# Patient Record
Sex: MALE | Race: WHITE | NOT HISPANIC OR LATINO | Employment: FULL TIME | URBAN - METROPOLITAN AREA
[De-identification: names, ages, dates, MRNs, and addresses within clinical notes are randomized per-mention and may not be internally consistent; named-entity substitution may affect disease eponyms.]

---

## 2017-01-12 ENCOUNTER — HOSPITAL ENCOUNTER (OUTPATIENT)
Dept: UROLOGY | Facility: HOSPITAL | Age: 62
Discharge: HOME | End: 2017-01-12
Attending: UROLOGY
Payer: COMMERCIAL

## 2017-01-12 VITALS
TEMPERATURE: 99 F | HEART RATE: 76 BPM | DIASTOLIC BLOOD PRESSURE: 95 MMHG | HEIGHT: 69 IN | RESPIRATION RATE: 18 BRPM | BODY MASS INDEX: 28.14 KG/M2 | SYSTOLIC BLOOD PRESSURE: 149 MMHG | WEIGHT: 190 LBS

## 2017-01-12 DIAGNOSIS — Z80.42 FAMILY HISTORY OF MALIGNANT NEOPLASM OF PROSTATE: ICD-10-CM

## 2017-01-12 DIAGNOSIS — R31.0 GROSS HEMATURIA: ICD-10-CM

## 2017-01-12 DIAGNOSIS — N20.0 KIDNEY STONE ON LEFT SIDE: ICD-10-CM

## 2017-01-12 DIAGNOSIS — N35.9 URETHRAL STRICTURE, UNSPECIFIED STRICTURE TYPE: ICD-10-CM

## 2017-01-12 DIAGNOSIS — N40.0 BENIGN PROSTATIC HYPERPLASIA, PRESENCE OF LOWER URINARY TRACT SYMPTOMS UNSPECIFIED, UNSPECIFIED MORPHOLOGY: Primary | ICD-10-CM

## 2017-01-12 PROCEDURE — 52000 CYSTOURETHROSCOPY: CPT

## 2017-01-12 PROCEDURE — 25000003 PHARM REV CODE 250: Performed by: NURSE PRACTITIONER

## 2017-01-12 PROCEDURE — 52000 CYSTOURETHROSCOPY: CPT | Mod: ,,, | Performed by: UROLOGY

## 2017-01-12 RX ORDER — LIDOCAINE HYDROCHLORIDE 20 MG/ML
JELLY TOPICAL ONCE
Status: COMPLETED | OUTPATIENT
Start: 2017-01-12 | End: 2017-01-12

## 2017-01-12 RX ORDER — CIPROFLOXACIN 500 MG/1
500 TABLET ORAL ONCE
Status: COMPLETED | OUTPATIENT
Start: 2017-01-12 | End: 2017-01-12

## 2017-01-12 RX ADMIN — LIDOCAINE HYDROCHLORIDE: 20 JELLY TOPICAL at 09:01

## 2017-01-12 RX ADMIN — CIPROFLOXACIN HYDROCHLORIDE 500 MG: 750 TABLET, FILM COATED ORAL at 09:01

## 2017-01-12 NOTE — PATIENT INSTRUCTIONS
What to Expect After a Cystoscopy  For the next 24-48 hours, you may feel a mild burning when you urinate. This burning is normal and expected. Drink plenty of water to dilute the urine to help relieve the burning sensation. You may also see a small amount of blood in your urine after the procedure.    Unless you are already taking antibiotics, you may be given an antibiotic after the test to prevent infection.    Signs and Symptoms to Report  Call the [facility] at [phone] if you develop any of the following:  · Fever of 101 degrees or higher  · Chills or persistent bleeding  · Inability to urinate

## 2017-01-12 NOTE — IP AVS SNAPSHOT
[facility]  [address]  [facility] [address]  Phone: [phone]  Fax: [phone]                  Ruel Santacruz MD   2017  9:00 AM   Cystoscopy    Description:  Male : 1955   Provider:  RYAN UROLOGY   Department:  [facility]           Visit Information     Date & Time Provider Department    2017  9:00 AM RYAN UROLOGY [facility]      Recent Lab Values        2013 10/7/2013 10/11/2016 10/11/2016                  9:41 AM  1:17 PM  8:38 AM  3:30 PM        Urine Culture - - - No growth        Color - yellow Yellow -        Specific Gravity - 1.015 1.020 -        pH - 5 6.0 -        Leukocytes - n - -        Blood - n - -        Nitrite - n Negative -        Ketones - n Negative -        Bilirubin - n - -        Urobilinogen - n Negative -        Protein - n - -        Glucose - n - -        PSA 0.37 - - -        Comment for PSA at  9:41 AM on 2013:  PSA Expected levels:Hormonal Therapy: <0.05 ng/mlProstatectomy: <0.01 ng/mlRadiation Therapy: <1.00 ng/ml      Reason for Visit     Hematuria           Diagnoses this Visit        Comments    Urethral stricture, unspecified stricture type         Gross hematuria                To Do List           Goals (5 Years of Data)     None           Medications                ** Verify the list of medication(s) below is accurate and up to date. Carry this with you in case of emergency. If your medications have changed, please notify your healthcare provider.             Medication List      TAKE these medications        Additional Info                      oxycodone-acetaminophen 5-325 mg per tablet   Commonly known as:  PERCOCET   Quantity:  20 tablet   Refills:  0   Dose:  1 tablet    Instructions:  Take 1 tablet by mouth every 6 (six) hours as needed for Pain.     Begin Date    AM    Noon    PM    Bedtime       triamcinolone acetonide 0.1% 0.1 % cream   Commonly  "known as:  KENALOG   Quantity:  80 g   Refills:  3    Instructions:  Apply topically 2 (two) times daily. Apply to affected area 2-4 times daily     Begin Date    AM    Noon    PM    Bedtime               Your Vitals Were     Temp Resp Height Weight BMI    98.6 °F (37 °C) (Oral) 18 5' 9" (1.753 m) 86.2 kg (190 lb) 28.06 kg/m2      Allergies as of 1/12/2017     Iodine And Iodide Containing Products      Immunizations Administered on Date of Encounter - 1/12/2017     None      Orders Placed During Today's Visit      Normal Orders This Visit    Cystoscopy       Instructions    What to Expect After a Cystoscopy  For the next 24-48 hours, you may feel a mild burning when you urinate. This burning is normal and expected. Drink plenty of water to dilute the urine to help relieve the burning sensation. You may also see a small amount of blood in your urine after the procedure.    Unless you are already taking antibiotics, you may be given an antibiotic after the test to prevent infection.    Signs and Symptoms to Report  Call the [facility] at [phone] if you develop any of the following:  · Fever of 101 degrees or higher  · Chills or persistent bleeding  · Inability to urinate       Advance Directives     An advance directive is a document which, in the event you are no longer able to make decisions for yourself, tells your healthcare team what kind of treatment you do or do not want to receive, or who you would like to make those decisions for you.  If you do not currently have an advance directive, [facility] encourages you to create one.  For more information call:  [phone]), [phone] ([phone]),  or log on to [email or web address].        [facility] On Call     [facility] Care Line - 24/7 Assistance  Registered nurses in the [facility] provide clinical advisement, health education, appointment booking, and other advisory services.  Call for this free " service at [phone].        Language Assistance Services     ATTENTION: Language assistance services are available, free of charge. Please call [phone].      ATENCIÓN: Si habla jay jay, tiene a perez disposición servicios gratuitos de asistencia lingüística. Llame al [phone].     CHÚ Ý: N?u b?n nói Ti?ng Vi?t, có các d?ch v? h? tr? ngôn ng? mi?n phí dành cho b?n. G?i s? [phone].         [facility] complies with applicable Federal civil rights laws and does not discriminate on the basis of race, color, national origin, age, disability, or sex.

## 2017-01-12 NOTE — IP AVS SNAPSHOT
[address]  [place] [address]  Phone: [phone]           I have received a copy of my After Visit Summary and discharge instructions from [facility].    INSTRUCTIONS RECEIVED AND UNDERSTOOD BY:                     Patient/Patient Representative: ________________________________________________________________     Date/Time: ________________________________________________________________                     Instructions Given By: ________________________________________________________________     Date/Time: ________________________________________________________________

## 2017-01-12 NOTE — INTERVAL H&P NOTE
The patient has been examined and the H&P has been reviewed:  Is here for cysto to follow up his urethral stricture.    I concur with the findings and no changes have occurred since H&P was written.        There are no hospital problems to display for this patient.

## 2017-01-12 NOTE — H&P
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  His father and older brother were diagnosed with prostate cancer at age 60's  He is a physician at [employer].  He was last seen in clinic 10/11/2016 for gross hematuria.     11/03/2016 cysto  Findings:  Urethra: membranous urethral stricture, pinhole in size.  Guidewire placed under direct vision thru the stricture area and the stricture was dilated up to 18F in size by using Haymen dilators.   It was complicated by false passage during dilation. Glide wire, super stiff guide wire used to establish the normal urethral channel.  Then using Haymen dilators the stricture area was dilated from 10 F to 18 F in size.  18 F Gould tip catheter placed over the guidewire after cysto was done.      Sphincter: competent.  Prostate: inflammed 4 cm bilateral mild obstruction  Bladder neck: patent with no stricture  Bladder: Normal bladder.   Normal ureteral orifices bilaterally.   Moderate trabeculation.      Here today for torres removal.  Torres draining well.  No hematuria.              PSA 0.33 01/05/2016   PSA 0.48 01/07/2015   PSA 0.37 09/28/2013   PSA 0.52 08/21/2012   PSA 0.49 10/18/2011   PSA 0.58 08/12/2011   PSA 0.64 07/12/2010                Past Medical History:  Neutropenia   Sensorineural hearing loss, unspecified   Tear of lateral cartilage or meniscus of knee,*   Thrombocytopenia      Past Surgical History:  APPENDECTOMY   KNEE SURGERY 4/2012   Comment:left knee- arthroscopic-- torn meniscus  BCC- nose      Review of patient's family history indicates:  Cancer Mother   Comment: breast  Cancer Father   Comment: prostate  Cancer Brother   Comment: prostate        Social History  Marital Status:  Spouse Name:   Years of Education: Number of children:      Occupational History  None on file     Social History Main Topics  Smoking Status: Never  Smoker   Smokeless Status: Not on file   Alcohol Use: No   Drug Use: Not on file   Sexual Activity: Not on file      Other Topics Concern  None on file     Social History Narrative     Spouse in good health   3 children , 1 son, 2 dtrs           Allergies:  Review of patient's allergies indicates no known allergies.     Medications:  Current outpatient prescriptions:  valacyclovir (VALTREX) 1000 MG tablet, , Disp: , Rfl:                     Review of Systems   Constitutional: Negative. Negative for activity change, appetite change and fever.   HENT: Negative. Negative for facial swelling and trouble swallowing.   Eyes: Negative.   Respiratory: Negative. Negative for shortness of breath.   Cardiovascular: Negative. Negative for chest pain and palpitations.   Gastrointestinal: Negative for abdominal pain, constipation, diarrhea, nausea and vomiting.   Genitourinary: Positive for penile pain. Negative for difficulty urinating, dysuria, enuresis, flank pain, frequency, genital sores, hematuria, nocturia, scrotal swelling, testicular pain and urgency.   Bello draining;   Distal penis discomfort.  Musculoskeletal: Negative for back pain, gait problem and neck stiffness.   Skin: Negative. Negative for wound.   Neurological: Negative for dizziness, tremors, seizures, syncope, speech difficulty, light-headedness and headaches.   Hematological: Does not bruise/bleed easily.   Psychiatric/Behavioral: Negative for confusion and hallucinations. The patient is not nervous/anxious.       Objective:      Physical Exam   Nursing note and vitals reviewed.  Constitutional: He is oriented to person, place, and time. Vital signs are normal. He appears well-developed and well-nourished. He is active and cooperative. Non-toxic appearance. He does not have a sickly appearance.   HENT:   Head: Normocephalic and atraumatic.   Right Ear: External ear normal.   Left Ear: External ear normal.   Nose: Nose normal.   Mouth/Throat: Mucous  membranes are normal.   Eyes: Conjunctivae and lids are normal. No scleral icterus.   Neck: Trachea normal, normal range of motion and full passive range of motion without pain. Neck supple. No JVD present. No tracheal deviation present.   Cardiovascular: Normal rate, regular rhythm, S1 normal and S2 normal.   Pulmonary/Chest: Effort normal and breath sounds normal. No respiratory distress. He exhibits no tenderness.   Abdominal: Soft. Normal appearance and bowel sounds are normal. There is no hepatosplenomegaly. There is no tenderness. There is no rebound, no guarding and no CVA tenderness.   Genitourinary: Testes normal. Right testis shows no mass, no swelling and no tenderness. Left testis shows no mass, no swelling and no tenderness. Circumcised. Penile erythema and penile tenderness present. No hypospadias. No discharge found.   Musculoskeletal: Normal range of motion.   Neurological: He is alert and oriented to person, place, and time. He has normal strength.   Skin: Skin is warm, dry and intact.     Psychiatric: He has a normal mood and affect. His behavior is normal. Judgment and thought content normal.       Assessment:       1. Urethral stricture, unspecified stricture type    2. Gross hematuria        Plan:         I spent 15 minutes with the patient of which more than half was spent in direct consultation with the patient in regards to our treatment and plan.   Education and recommendations of today's plan of care including home remedies.  Torres easily removed; discussed post torres removal expectations.  RTC 2 months for cysto with Dr. Ojeda.

## 2017-01-12 NOTE — PROCEDURES
Procedure Date:  01/12/2017      Procedure:  Male Diagnostic Cystourethroscopy    Pre-op diagnosis: proximal bulbar urethral stricture  Post-op diagnosis: same, left lower pole kidney stone  Anesthesia: Local  Surgeon:  Jonathan Ojeda MD    Findings:  Urethra:  Normal urethra with 17 F size stable bulbar stricture 2 cm away from the external sphincter.   Sphincter: competent.  Prostate: Estimated Length Prostatic Urethra: 3.5 cm with mild obstruction  Bladder neck: patent with no stricture  Bladder:  Normal bladder.   Normal ureteral orifices bilaterally.   Mild trabeculation.     Description of Procedure:                                                         Informed Consent:                                                            - Risks, benefits and alternatives of procedure discussed with               patient and informed consent obtained.       Patient Position:   - Supine. --- Bladder ---   Prep and Drape:   - Patient prepped and draped in usual sterile fashion using povidone     iodine (Betadine).   Instruments:   - 16 Fr flexible cystoscope with 0 degree lens.   Procedure Details:   - Cystoscope passed under vision into bladder.   - Bladder and urethra examined in their entirety with findings as     above.     Conclusion:  1. Stable bulbar urethral stricture  2. Left lower pole stone 8 mm in size.  If he desires, we can plan left ESWL.  Otherwise, recommend annual check up with KUB to follow his kidney stone burden.  If he notices decreased urine flow, may consider DVIU combined with left ESWL.    Plan:  Patient was discharged home in a stable condition.  Medications: cipro  Follow up:  1 year with PSA and KUB    Benign prostatic hyperplasia, presence of lower urinary tract symptoms unspecified, unspecified morphology  -     Prostate Specific Antigen, Diagnostic; Future; Expected date: 1/12/17    Urethral stricture, unspecified stricture type  -     Cystoscopy; Standing  -     Cystoscopy    Gross  hematuria  -     Cystoscopy; Standing  -     Cystoscopy    Kidney stone on left side  -     X-Ray Abdomen AP 1 View; Future; Expected date: 1/12/17    Family history of malignant neoplasm of prostate  -     Prostate Specific Antigen, Diagnostic; Future; Expected date: 1/12/17    Other orders  -     lidocaine HCl 2% urojet; Place into the urethra once.  -     ciprofloxacin HCl tablet 500 mg; Take 1 tablet (500 mg total) by mouth once.

## 2017-05-04 ENCOUNTER — TELEPHONE (OUTPATIENT)
Dept: UROLOGY | Facility: CLINIC | Age: 62
End: 2017-05-04

## 2017-05-04 DIAGNOSIS — N52.9 ED (ERECTILE DYSFUNCTION) OF ORGANIC ORIGIN: Primary | ICD-10-CM

## 2017-05-04 RX ORDER — TADALAFIL 20 MG/1
20 TABLET ORAL
Qty: 8 TABLET | Refills: 11 | Status: SHIPPED | OUTPATIENT
Start: 2017-05-04 | End: 2022-02-23

## 2017-05-04 NOTE — TELEPHONE ENCOUNTER
Reports issue with ED; would like to try oral agent  Discussed benefits, risks, se of cialis.  Rx sent to [facility].  Will let me know if any problems

## 2017-05-04 NOTE — TELEPHONE ENCOUNTER
----- Message from Harriett Eisenberg LPN sent at 5/4/2017  8:13 AM CDT -----  Contact: [phone]  Dr.Delord Kerry would like for you to call him back. He has some questions for you.   Call him on his cell [phone]      Thank you,  Harriett

## 2017-06-01 ENCOUNTER — TELEPHONE (OUTPATIENT)
Dept: INTERNAL MEDICINE | Facility: CLINIC | Age: 62
End: 2017-06-01

## 2017-06-01 DIAGNOSIS — Z11.59 NEED FOR HEPATITIS C SCREENING TEST: ICD-10-CM

## 2017-06-01 DIAGNOSIS — Z00.00 ANNUAL PHYSICAL EXAM: Primary | ICD-10-CM

## 2017-06-02 ENCOUNTER — TELEPHONE (OUTPATIENT)
Dept: INTERNAL MEDICINE | Facility: CLINIC | Age: 62
End: 2017-06-02

## 2017-06-02 NOTE — TELEPHONE ENCOUNTER
----- Message from Jo-Ann Rdz MD sent at 6/2/2017 12:52 PM CDT -----  Please review your lab work and we will discuss at your pending office visit.  Jo-Ann Barton

## 2017-06-05 NOTE — PROGRESS NOTES
61-year-old gentleman presents today for Annual PE.     nonsmoker and non-alcohol consumer.                                                                                                                       FAMILY HISTORY:    Dad d. prostate cancer, HTN.    Mom d. breast cancer. +atrial fibrillation, HTN, & mild  dementia.    Brother, + prostate cancer & hypertension.           Grandmother s/p diabetes and both parents had hypertension.                                                                                               SCREENING TEST:   Chol/lab, reviewed  C-scope, 3/2008, Dr. Williamson- rec 5-7  PSA/prostate, Dr. Ojeda, c-scope 2/2 hematuria 10/2016, s/p stenosis w/ dilatation  Eye exam, UTD  DDS exam,UTD  VACCINATIONS:   TDAP, UTD, >8yrs <10yrs   Flu, yearly   Zostavax, s/p shingles ` 2 yrs   Pneumovax, n/a   Prevnar, n/a                                                                                             REVIEW OF SYSTEMS:  No weight change.   No fever, chills, or night sweats  No sinus or ear congestion or pain.  No cough or wheezing   Occasional indigestion when he        drinks too much coffee and he just cuts down on his consumption  No headache, dizziness,     No chest pain, shortness of breath, PND or orthopnea.    No change in bowel or bladder function.    No blood in the stool or urine.    No joint problems or skin lesions  Remainder of review negative except as previously noted.                                                                                                                                                                                                                                         SOCIAL HISTORY:     30 + years, wife in good health.    Three children,     GILDA Albarran .w/ Yissel stockton and Romero Jesus , and FCO Wallace pending, Edin works  @ [employer]       Elidia, PT  Assistant, headed for PT school, in [place]  Drinks no more than 4 cups of  coffee a day.  Wears seatbelt.           Exercises on a regular basis.  Run most every morning around 4:30 to 5 o'    clock, vacations when he can.  Internist, working @ [employer]                                                                                                                             PHYSICAL EXAM:   VSS                        GENERAL:  He is alert and oriented and in no acute distress.  He is well     developed, well nourished, conversant and cooperative.  Pleasant as always                     EYES:Conjunctivae and lids are okay. Sclera anicteric   ENT: Oropharynx is unremarkable.                 NECK:  Supple.  No thyromegaly noted.                                        RESPIRATORY:  Efforts unlabored.  Lungs are clear to auscultation.           HEART:  Regular rate and rhythm.  No carotid bruits noted,   1+ pedal pulses,no edema.                                                                    ABDOMEN:  Bowel sounds are present, soft and nontender.  No                  hepatosplenomegaly.                                                          MSK: Gait is normal. No CCE  NEURO: EARLY. No tremore noted    SKIN: Warm and dry                                                                                                                                        IMPRESSION:                                                                   Annual PE                                                                 Family history of prostate cancer.                                         Family history of cardiovascular disease.                                                                                                                                                               PLAN:    Reviewed lab  Continue diet and exercise  Call prn  RTC 1 year EPP

## 2017-06-06 ENCOUNTER — OFFICE VISIT (OUTPATIENT)
Dept: INTERNAL MEDICINE | Facility: CLINIC | Age: 62
End: 2017-06-06
Payer: COMMERCIAL

## 2017-06-06 VITALS
BODY MASS INDEX: 27.75 KG/M2 | WEIGHT: 187.38 LBS | TEMPERATURE: 98 F | DIASTOLIC BLOOD PRESSURE: 69 MMHG | HEIGHT: 69 IN | SYSTOLIC BLOOD PRESSURE: 131 MMHG | RESPIRATION RATE: 16 BRPM | HEART RATE: 63 BPM

## 2017-06-06 DIAGNOSIS — Z00.00 ANNUAL PHYSICAL EXAM: Primary | ICD-10-CM

## 2017-06-06 PROBLEM — N35.919 URETHRAL STRICTURE: Status: ACTIVE | Noted: 2017-06-06

## 2017-06-06 PROBLEM — K63.5 HYPERPLASTIC COLONIC POLYP: Status: ACTIVE | Noted: 2017-06-06

## 2017-06-06 PROBLEM — N20.0 NEPHROLITHIASIS: Status: ACTIVE | Noted: 2017-06-06

## 2017-06-06 PROCEDURE — 99999 PR PBB SHADOW E&M-EST. PATIENT-LVL III: CPT | Mod: PBBFAC,,, | Performed by: INTERNAL MEDICINE

## 2017-06-06 PROCEDURE — 99386 PREV VISIT NEW AGE 40-64: CPT | Mod: S$GLB,,, | Performed by: INTERNAL MEDICINE

## 2018-01-04 ENCOUNTER — TELEPHONE (OUTPATIENT)
Dept: UROLOGY | Facility: CLINIC | Age: 63
End: 2018-01-04

## 2018-01-05 ENCOUNTER — TELEPHONE (OUTPATIENT)
Dept: SURGERY | Facility: CLINIC | Age: 63
End: 2018-01-05

## 2018-02-05 ENCOUNTER — OFFICE VISIT (OUTPATIENT)
Dept: UROLOGY | Facility: CLINIC | Age: 63
End: 2018-02-05
Payer: COMMERCIAL

## 2018-02-05 VITALS
HEART RATE: 62 BPM | WEIGHT: 185 LBS | DIASTOLIC BLOOD PRESSURE: 89 MMHG | SYSTOLIC BLOOD PRESSURE: 150 MMHG | BODY MASS INDEX: 27.4 KG/M2 | HEIGHT: 69 IN

## 2018-02-05 DIAGNOSIS — R31.29 HEMATURIA, MICROSCOPIC: ICD-10-CM

## 2018-02-05 DIAGNOSIS — N35.9 URETHRAL STRICTURE, UNSPECIFIED STRICTURE TYPE: Primary | ICD-10-CM

## 2018-02-05 DIAGNOSIS — N52.9 ED (ERECTILE DYSFUNCTION) OF ORGANIC ORIGIN: ICD-10-CM

## 2018-02-05 DIAGNOSIS — N20.0 NEPHROLITHIASIS: ICD-10-CM

## 2018-02-05 PROCEDURE — 3008F BODY MASS INDEX DOCD: CPT | Mod: S$GLB,,, | Performed by: UROLOGY

## 2018-02-05 PROCEDURE — 99999 PR PBB SHADOW E&M-EST. PATIENT-LVL III: CPT | Mod: PBBFAC,,, | Performed by: UROLOGY

## 2018-02-05 PROCEDURE — 99214 OFFICE O/P EST MOD 30 MIN: CPT | Mod: S$GLB,,, | Performed by: UROLOGY

## 2018-02-05 RX ORDER — SILDENAFIL 100 MG/1
100 TABLET, FILM COATED ORAL DAILY PRN
Qty: 5 TABLET | Refills: 12 | Status: SHIPPED | OUTPATIENT
Start: 2018-02-05 | End: 2018-03-07

## 2018-02-05 RX ORDER — CIPROFLOXACIN 250 MG/1
500 TABLET, FILM COATED ORAL ONCE
Status: CANCELLED | OUTPATIENT
Start: 2018-02-05 | End: 2018-02-05

## 2018-02-05 RX ORDER — LIDOCAINE HYDROCHLORIDE 20 MG/ML
JELLY TOPICAL ONCE
Status: CANCELLED | OUTPATIENT
Start: 2018-02-05 | End: 2018-02-05

## 2018-02-05 NOTE — PROGRESS NOTES
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  His father and older brother were diagnosed with prostate cancer at age 60's  He is a physician at [employer].  He was last seen in clinic 10/11/2016 for gross hematuria.    11/03/2016 cysto  Findings:  Urethra: membranous urethral stricture, pinhole in size.  Guidewire placed under direct vision thru the stricture area and the stricture was dilated up to 18F in size by using Haymen dilators.   It was complicated by false passage during dilation. Glide wire, super stiff guide wire used to establish the normal urethral channel.  Then using Haymen dilators the stricture area was dilated from 10 F to 18 F in size.  18 F [place] tip catheter placed over the guidewire after cysto was done.     Sphincter: competent.  Prostate: inflammed 4 cm bilateral mild obstruction  Bladder neck: patent with no stricture  Bladder: Normal bladder.   Normal ureteral orifices bilaterally.   Moderate trabeculation.     Since his cysto, he has been voiding well.  However, he notices blood in urine after running.    Had KUB recently.             Lab Results       Component                Value               Date                       PSA                      0.37                09/28/2013                 PSA                      0.52                08/21/2012                 PSA                      0.49                10/18/2011                 PSADIAG                  0.48                06/02/2017                 PSADIAG                  0.33                01/05/2016                 PSADIAG                  0.48                01/07/2015                  Past Medical History:    Neutropenia                                                    Sensorineural hearing loss, unspecified                       Tear of lateral cartilage or meniscus of knee,*               Thrombocytopenia                                              Past Surgical History:    APPENDECTOMY                                                   KNEE SURGERY                                     4/2012          Comment:left knee- arthroscopic-- torn meniscus    BCC- nose                                                      Review of patient's family history indicates:    Cancer                         Mother                      Comment: breast    Cancer                         Father                      Comment: prostate    Cancer                         Brother                     Comment: prostate      Social History    Marital Status:              Spouse Name:                       Years of Education:                 Number of children:               Occupational History    None on file    Social History Main Topics    Smoking Status: Never Smoker                      Smokeless Status: Not on file                       Alcohol Use: No              Drug Use: Not on file     Sexual Activity: Not on file          Other Topics            Concern    None on file    Social History Narrative         Spouse in good health     3 children , 1 son, 2 dtrs        Allergies:  Review of patient's allergies indicates no known allergies.    Medications:  Current outpatient prescriptions:   valacyclovir (VALTREX) 1000 MG tablet, , Disp: , Rfl:                   Medication Refill   Pertinent negatives include no abdominal pain, chest pain, fever, headaches, nausea or vomiting.     Review of Systems   Constitutional: Negative.  Negative for activity change, appetite change and fever.   HENT: Negative.  Negative for facial swelling and trouble swallowing.    Eyes: Negative.    Respiratory: Negative.  Negative for shortness of breath.    Cardiovascular: Negative.  Negative for chest pain  and palpitations.   Gastrointestinal: Negative for abdominal pain, constipation, diarrhea, nausea and vomiting.   Genitourinary: Negative for difficulty urinating, dysuria, enuresis, flank pain, frequency, genital sores, hematuria, nocturia, penile pain, scrotal swelling, testicular pain and urgency.        Bello draining;   Distal penis discomfort.     Musculoskeletal: Negative for back pain, gait problem and neck stiffness.   Skin: Negative.  Negative for wound.   Neurological: Negative for dizziness, tremors, seizures, syncope, speech difficulty, light-headedness and headaches.   Hematological: Does not bruise/bleed easily.   Psychiatric/Behavioral: Negative for confusion and hallucinations. The patient is not nervous/anxious.        Objective:      Physical Exam   Nursing note and vitals reviewed.  Constitutional: He is oriented to person, place, and time. Vital signs are normal. He appears well-developed and well-nourished. He is active and cooperative.  Non-toxic appearance. He does not have a sickly appearance.   HENT:   Head: Normocephalic and atraumatic.   Right Ear: External ear normal.   Left Ear: External ear normal.   Nose: Nose normal.   Mouth/Throat: Mucous membranes are normal.   Eyes: Conjunctivae and lids are normal. No scleral icterus.   Neck: Trachea normal, normal range of motion and full passive range of motion without pain. Neck supple. No JVD present. No tracheal deviation present.   Cardiovascular: Normal rate, regular rhythm, S1 normal and S2 normal.    Pulmonary/Chest: Effort normal and breath sounds normal. No respiratory distress. He exhibits no tenderness.   Abdominal: Soft. Normal appearance and bowel sounds are normal. There is no hepatosplenomegaly. There is no tenderness. There is no rebound, no guarding and no CVA tenderness.   Genitourinary: Testes normal. Right testis shows no mass, no swelling and no tenderness. Left testis shows no mass, no swelling and no tenderness.  Circumcised. No hypospadias, penile erythema or penile tenderness. No discharge found.   Musculoskeletal: Normal range of motion.   Neurological: He is alert and oriented to person, place, and time. He has normal strength.   Skin: Skin is warm, dry and intact.     Psychiatric: He has a normal mood and affect. His behavior is normal. Judgment and thought content normal.       Assessment:       Ruel was seen today for annual exam and medication refill.    Diagnoses and all orders for this visit:    Urethral stricture, unspecified stricture type  -     Cystoscopy; Future  -     Cystoscopy; Future    ED (erectile dysfunction) of organic origin  -     sildenafil (VIAGRA) 100 MG tablet; Take 1 tablet (100 mg total) by mouth daily as needed for Erectile Dysfunction.    Nephrolithiasis  -     US Retroperitoneal Limited; Future    Hematuria, microscopic  -     US Retroperitoneal Limited; Future  -     Cystoscopy; Future  -     Cystoscopy; Future    Other orders  -     lidocaine HCl 2% urojet; Place into the urethra once.  -     ciprofloxacin HCl tablet 500 mg; Take 2 tablets (500 mg total) by mouth once.      Plan:         will try viagra.  If not improving his ED, recommend to consider PEP injection.  For his hematuria, will check US of bladder and kidneys.  Repeat cysto.  I spent 25 minutes with the patient of which more than half was spent in direct consultation with the patient in regards to our treatment and plan.        Follow-up for cysto.     CT 10/12/2016     1. Multiple bilateral nonobstructing renal calculi.    2. Dilatation of inferior vena cava and pelvic veins with normal cardiac size, this is of unknown clinical significance

## 2018-02-08 ENCOUNTER — TELEPHONE (OUTPATIENT)
Dept: UROLOGY | Facility: CLINIC | Age: 63
End: 2018-02-08

## 2018-02-08 NOTE — TELEPHONE ENCOUNTER
Patient requesting to delay cysto to the first week of April .priscila,t made and confirmed for 4.5.2018 at 745am

## 2018-02-08 NOTE — TELEPHONE ENCOUNTER
----- Message from Monisha Cheung sent at 2/8/2018  8:26 AM CST -----  Contact: Self- [phone]  Ojeda- pt called to speak with Sylvia regarding rescheduling his upcoming cystoscope- please call pt back at [phone]

## 2018-04-05 ENCOUNTER — HOSPITAL ENCOUNTER (OUTPATIENT)
Dept: UROLOGY | Facility: HOSPITAL | Age: 63
Discharge: HOME | End: 2018-04-05
Attending: UROLOGY
Payer: COMMERCIAL

## 2018-04-05 VITALS
DIASTOLIC BLOOD PRESSURE: 85 MMHG | WEIGHT: 190.69 LBS | TEMPERATURE: 98 F | HEART RATE: 71 BPM | RESPIRATION RATE: 18 BRPM | BODY MASS INDEX: 28.24 KG/M2 | HEIGHT: 69 IN | SYSTOLIC BLOOD PRESSURE: 174 MMHG

## 2018-04-05 DIAGNOSIS — R31.29 HEMATURIA, MICROSCOPIC: ICD-10-CM

## 2018-04-05 DIAGNOSIS — N35.9 URETHRAL STRICTURE, UNSPECIFIED STRICTURE TYPE: ICD-10-CM

## 2018-04-05 PROCEDURE — 52000 CYSTOURETHROSCOPY: CPT | Mod: ,,, | Performed by: UROLOGY

## 2018-04-05 PROCEDURE — 52000 CYSTOURETHROSCOPY: CPT

## 2018-04-05 RX ORDER — CIPROFLOXACIN 500 MG/1
500 TABLET ORAL ONCE
Status: COMPLETED | OUTPATIENT
Start: 2018-04-05 | End: 2018-04-05

## 2018-04-05 RX ORDER — SILDENAFIL 100 MG/1
100 TABLET, FILM COATED ORAL DAILY PRN
COMMUNITY
End: 2022-02-23

## 2018-04-05 RX ORDER — LIDOCAINE HYDROCHLORIDE 20 MG/ML
JELLY TOPICAL ONCE
Status: COMPLETED | OUTPATIENT
Start: 2018-04-05 | End: 2018-04-05

## 2018-04-05 RX ADMIN — LIDOCAINE HYDROCHLORIDE: 20 JELLY TOPICAL at 08:04

## 2018-04-05 RX ADMIN — CIPROFLOXACIN 500 MG: 500 TABLET ORAL at 08:04

## 2018-04-05 NOTE — INTERVAL H&P NOTE
The patient has been examined and the H&P has been reviewed:  Lab Results   Component Value Date    PSA 0.37 09/28/2013    PSA 0.52 08/21/2012    PSA 0.49 10/18/2011    PSADIAG 0.48 06/02/2017    PSADIAG 0.33 01/05/2016    PSADIAG 0.48 01/07/2015     I concur with the findings and no changes have occurred since H&P was written.        There are no hospital problems to display for this patient.

## 2018-04-05 NOTE — PROCEDURES
Procedure Date:  04/05/2018      Procedure:  Male Diagnostic Cystourethroscopy    Pre-op diagnosis: hematuria, hx of urethral stricture, left renal stone  Post-op diagnosis: normal cysto  Anesthesia: Local  Surgeon:  Jonathan Ojeda MD    Findings:  Urethra:  Normal urethra. Mild stricture involving very proximal bulbar urethra near the external sphincter.  Sphincter: competent.  Prostate: Estimated Length Prostatic Urethra: 4 cm with mild bilateral obstruction with some mucosal irritation, multiple micro-stones on the mucosa  Bladder neck: patent with no stricture  Bladder:  Normal bladder.   Normal ureteral orifices bilaterally.   Mild to Moderate trabeculation.     Description of Procedure:                                                         Informed Consent:                                                            - Risks, benefits and alternatives of procedure discussed with               patient and informed consent obtained.       Patient Position:   - Supine. --- Bladder ---   Prep and Drape:   - Patient prepped and draped in usual sterile fashion using povidone     iodine (Betadine).   Instruments:   - 16 Fr flexible cystoscope with 0 degree lens.   Procedure Details:   - Cystoscope passed under vision into bladder.   - Bladder and urethra examined in their entirety with findings as     above.     Conclusion:  1. Normal cysto  Hematuria may be related to prostate irritation.  In the future, he has recurrent hematuria typically following his running, we may do urine for cytology.  2. Left renal stone.  Recommend left ESWL sine it is growing is in size.  He would consider it this October after his vacation trip.    Plan:  Patient was discharged home in a stable condition.  Medications: cipro  Follow up:  As needed.

## 2018-04-05 NOTE — H&P
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  His father and older brother were diagnosed with prostate cancer at age 60's  He is a physician at [employer].  He was last seen in clinic 10/11/2016 for gross hematuria.     11/03/2016 cysto  Findings:  Urethra: membranous urethral stricture, pinhole in size.  Guidewire placed under direct vision thru the stricture area and the stricture was dilated up to 18F in size by using Haymen dilators.   It was complicated by false passage during dilation. Glide wire, super stiff guide wire used to establish the normal urethral channel.  Then using Haymen dilators the stricture area was dilated from 10 F to 18 F in size.  18 F [place] tip catheter placed over the guidewire after cysto was done.     Sphincter: competent.  Prostate: inflammed 4 cm bilateral mild obstruction  Bladder neck: patent with no stricture  Bladder: Normal bladder.   Normal ureteral orifices bilaterally.   Moderate trabeculation.      Since his cysto, he has been voiding well.  However, he notices blood in urine after running.     Had KUB recently.             Lab Results       Component                Value               Date                       PSA                      0.37                09/28/2013                 PSA                      0.52                08/21/2012                 PSA                      0.49                10/18/2011                 PSADIAG                  0.48                06/02/2017                 PSADIAG                  0.33                01/05/2016                 PSADIAG                  0.48                01/07/2015                     Past Medical History:    Neutropenia                                                    Sensorineural hearing loss, unspecified                       Tear of lateral cartilage or meniscus of knee,*               Thrombocytopenia                                               Past Surgical History:    APPENDECTOMY                                                   KNEE SURGERY                                     4/2012          Comment:left knee- arthroscopic-- torn meniscus    BCC- nose                                                       Review of patient's family history indicates:    Cancer                         Mother                      Comment: breast    Cancer                         Father                      Comment: prostate    Cancer                         Brother                     Comment: prostate        Social History    Marital Status:              Spouse Name:                       Years of Education:                 Number of children:                Occupational History    None on file     Social History Main Topics    Smoking Status: Never Smoker                      Smokeless Status: Not on file                       Alcohol Use: No              Drug Use: Not on file     Sexual Activity: Not on file           Other Topics            Concern    None on file     Social History Narrative         Spouse in good health     3 children , 1 son, 2 dtrs           Allergies:  Review of patient's allergies indicates no known allergies.     Medications:  Current outpatient prescriptions:   valacyclovir (VALTREX) 1000 MG tablet, , Disp: , Rfl:                        Medication Refill   Pertinent negatives include no abdominal pain, chest pain, fever, headaches, nausea or vomiting.      Review of Systems   Constitutional: Negative.  Negative for activity change, appetite change and fever.   HENT: Negative.  Negative for facial swelling and trouble swallowing.    Eyes: Negative.    Respiratory: Negative.  Negative for shortness of breath.    Cardiovascular: Negative.  Negative  for chest pain and palpitations.   Gastrointestinal: Negative for abdominal pain, constipation, diarrhea, nausea and vomiting.   Genitourinary: Negative for difficulty urinating, dysuria, enuresis, flank pain, frequency, genital sores, hematuria, nocturia, penile pain, scrotal swelling, testicular pain and urgency.        Bello draining;   Distal penis discomfort.     Musculoskeletal: Negative for back pain, gait problem and neck stiffness.   Skin: Negative.  Negative for wound.   Neurological: Negative for dizziness, tremors, seizures, syncope, speech difficulty, light-headedness and headaches.   Hematological: Does not bruise/bleed easily.   Psychiatric/Behavioral: Negative for confusion and hallucinations. The patient is not nervous/anxious.        Objective:   Physical Exam   Nursing note and vitals reviewed.  Constitutional: He is oriented to person, place, and time. Vital signs are normal. He appears well-developed and well-nourished. He is active and cooperative.  Non-toxic appearance. He does not have a sickly appearance.   HENT:   Head: Normocephalic and atraumatic.   Right Ear: External ear normal.   Left Ear: External ear normal.   Nose: Nose normal.   Mouth/Throat: Mucous membranes are normal.   Eyes: Conjunctivae and lids are normal. No scleral icterus.   Neck: Trachea normal, normal range of motion and full passive range of motion without pain. Neck supple. No JVD present. No tracheal deviation present.   Cardiovascular: Normal rate, regular rhythm, S1 normal and S2 normal.    Pulmonary/Chest: Effort normal and breath sounds normal. No respiratory distress. He exhibits no tenderness.   Abdominal: Soft. Normal appearance and bowel sounds are normal. There is no hepatosplenomegaly. There is no tenderness. There is no rebound, no guarding and no CVA tenderness.   Genitourinary: Testes normal. Right testis shows no mass, no swelling and no tenderness. Left testis shows no mass, no swelling and no  tenderness. Circumcised. No hypospadias, penile erythema or penile tenderness. No discharge found.   Musculoskeletal: Normal range of motion.   Neurological: He is alert and oriented to person, place, and time. He has normal strength.   Skin: Skin is warm, dry and intact.     Psychiatric: He has a normal mood and affect. His behavior is normal. Judgment and thought content normal.       Assessment:       Ruel was seen today for annual exam and medication refill.     Diagnoses and all orders for this visit:     Urethral stricture, unspecified stricture type  -     Cystoscopy; Future  -     Cystoscopy; Future     ED (erectile dysfunction) of organic origin  -     sildenafil (VIAGRA) 100 MG tablet; Take 1 tablet (100 mg total) by mouth daily as needed for Erectile Dysfunction.     Nephrolithiasis  -     US Retroperitoneal Limited; Future     Hematuria, microscopic  -     US Retroperitoneal Limited; Future  -     Cystoscopy; Future  -     Cystoscopy; Future     Other orders  -     lidocaine HCl 2% urojet; Place into the urethra once.  -     ciprofloxacin HCl tablet 500 mg; Take 2 tablets (500 mg total) by mouth once.        Plan:         will try viagra.  If not improving his ED, recommend to consider PEP injection.  For his hematuria, will check US of bladder and kidneys.  Repeat cysto.  I spent 25 minutes with the patient of which more than half was spent in direct consultation with the patient in regards to our treatment and plan.          Follow-up for cysto.      CT 10/12/2016      1. Multiple bilateral nonobstructing renal calculi.    2. Dilatation of inferior vena cava and pelvic veins with normal cardiac size, this is of unknown clinical significance

## 2018-04-05 NOTE — PATIENT INSTRUCTIONS
What to Expect After a Cystoscopy  For the next 24-48 hours, you may feel a mild burning when you urinate. This burning is normal and expected. Drink plenty of water to dilute the urine to help relieve the burning sensation. You may also see a small amount of blood in your urine after the procedure.    Unless you are already taking antibiotics, you may be given an antibiotic after the test to prevent infection.    Signs and Symptoms to Report  Call the [facility] at [phone] if you develop any of the following:  · Fever of 101 degrees or higher  · Chills or persistent bleeding  · Inability to urinate

## 2018-07-05 ENCOUNTER — TELEPHONE (OUTPATIENT)
Dept: UROLOGY | Facility: CLINIC | Age: 63
End: 2018-07-05

## 2018-07-05 DIAGNOSIS — R10.9 RIGHT FLANK PAIN: Primary | ICD-10-CM

## 2018-07-05 DIAGNOSIS — N20.0 KIDNEY STONE: Primary | ICD-10-CM

## 2018-07-05 NOTE — TELEPHONE ENCOUNTER
----- Message from Sylvia Leach LPN sent at 7/5/2018 11:02 AM CDT -----  Contact: pt#[phone]  Pt states he went to [facility] on Tuesday for kidney stones, he will fax the report, he would like you to review as he believes he needs ESWL   ----- Message -----  From: Gale Rodriguez  Sent: 7/5/2018   8:13 AM  To: [facility]y [facility]IVERA Staff    Needs Advice    Reason for call:    Pt states that he had to go to ED for Kidney stones and he wants to speak with you about next step. He will fax records for ed visit   Communication Preference:callback   Additional Information:

## 2018-07-05 NOTE — TELEPHONE ENCOUNTER
Diagnoses and all orders for this visit:    Right flank pain  -     X-Ray Abdomen AP 1 View; Future  -     CT Renal Stone Study ABD Pelvis WO; Future    please schedule him for the x-ray studies on 7/13 Friday PM at [place].  I will see him on 7/16 Monday 3 pm for the clinic visit.

## 2018-07-05 NOTE — TELEPHONE ENCOUNTER
Diagnoses and all orders for this visit:    Kidney stone  -     X-Ray Abdomen AP 1 View; Future

## 2018-07-06 ENCOUNTER — HOSPITAL ENCOUNTER (OUTPATIENT)
Dept: RADIOLOGY | Facility: HOSPITAL | Age: 63
Discharge: HOME | End: 2018-07-06
Attending: UROLOGY
Payer: COMMERCIAL

## 2018-07-06 ENCOUNTER — TELEPHONE (OUTPATIENT)
Dept: UROLOGY | Facility: CLINIC | Age: 63
End: 2018-07-06

## 2018-07-06 DIAGNOSIS — R10.9 RIGHT FLANK PAIN: ICD-10-CM

## 2018-07-06 PROCEDURE — 74176 CT ABD & PELVIS W/O CONTRAST: CPT | Mod: 26,,, | Performed by: RADIOLOGY

## 2018-07-06 PROCEDURE — 74018 RADEX ABDOMEN 1 VIEW: CPT | Mod: TC,FY

## 2018-07-06 PROCEDURE — 74018 RADEX ABDOMEN 1 VIEW: CPT | Mod: 26,,, | Performed by: RADIOLOGY

## 2018-07-06 PROCEDURE — 74176 CT ABD & PELVIS W/O CONTRAST: CPT | Mod: TC

## 2018-07-06 NOTE — TELEPHONE ENCOUNTER
His creatinine is up to 1.6.  Will do his CT RSS and KUB today at [facility].  Please add him on this Monday at 3:30 pm in [facility] to see me.

## 2018-07-09 ENCOUNTER — OFFICE VISIT (OUTPATIENT)
Dept: UROLOGY | Facility: CLINIC | Age: 63
End: 2018-07-09
Payer: COMMERCIAL

## 2018-07-09 VITALS
HEART RATE: 62 BPM | WEIGHT: 189.63 LBS | DIASTOLIC BLOOD PRESSURE: 88 MMHG | HEIGHT: 69 IN | SYSTOLIC BLOOD PRESSURE: 143 MMHG | BODY MASS INDEX: 28.08 KG/M2

## 2018-07-09 DIAGNOSIS — N20.0 NEPHROLITHIASIS: Primary | ICD-10-CM

## 2018-07-09 DIAGNOSIS — E79.0 HYPERURICEMIA: ICD-10-CM

## 2018-07-09 DIAGNOSIS — R79.89 CREATININE ELEVATION: ICD-10-CM

## 2018-07-09 PROCEDURE — 99999 PR PBB SHADOW E&M-EST. PATIENT-LVL III: CPT | Mod: PBBFAC,,, | Performed by: UROLOGY

## 2018-07-09 PROCEDURE — 3077F SYST BP >= 140 MM HG: CPT | Mod: CPTII,S$GLB,, | Performed by: UROLOGY

## 2018-07-09 PROCEDURE — 99214 OFFICE O/P EST MOD 30 MIN: CPT | Mod: S$GLB,,, | Performed by: UROLOGY

## 2018-07-09 PROCEDURE — 3079F DIAST BP 80-89 MM HG: CPT | Mod: CPTII,S$GLB,, | Performed by: UROLOGY

## 2018-07-09 PROCEDURE — 3008F BODY MASS INDEX DOCD: CPT | Mod: CPTII,S$GLB,, | Performed by: UROLOGY

## 2018-07-09 RX ORDER — POTASSIUM CITRATE 10 MEQ/1
10 TABLET, EXTENDED RELEASE ORAL
Qty: 90 TABLET | Refills: 11 | Status: SHIPPED | OUTPATIENT
Start: 2018-07-09 | End: 2022-02-23

## 2018-07-29 ENCOUNTER — TELEPHONE (OUTPATIENT)
Dept: UROLOGY | Facility: CLINIC | Age: 63
End: 2018-07-29

## 2018-07-29 DIAGNOSIS — N20.1 URETERAL STONE: Primary | ICD-10-CM

## 2018-07-29 NOTE — TELEPHONE ENCOUNTER
Diagnoses and all orders for this visit:    Ureteral stone  -     X-Ray Abdomen AP 1 View; Future    please call him and see whether he can do a KUB Monday.  I will review the KUB and schedule his ureteroscopic stone removal surgery this Weds.

## 2018-07-29 NOTE — TELEPHONE ENCOUNTER
----- Message from Theodore Portillo MD sent at 7/28/2018 11:44 AM CDT -----  Jos Ojeda,     This patient called me today saying he recently went to [place], [place] and had flank pain and went to the ED. He had a CT scan that showed his lower pole renal stone has moved and travelled to his distal ureter. I don't have all the details but it sounds like he was OK for discharge from the hospital there. He says he is not currently in pain but there was perinephric stranding and moderate hydronephrosis. He was wondering if he could be added on sometime this coming week for ureteroscopy to remove the stone. He has not passed it as of yet. I will remind you on Monday when we all get back to work but I just wanted to let you know via message as well. He has a disc with him but I have not seen it as I have just spoken with him on the phone.       Thanks,  Theodore

## 2018-07-30 ENCOUNTER — ANESTHESIA EVENT (OUTPATIENT)
Dept: SURGERY | Facility: HOSPITAL | Age: 63
End: 2018-07-30
Payer: COMMERCIAL

## 2018-07-30 ENCOUNTER — HOSPITAL ENCOUNTER (OUTPATIENT)
Dept: RADIOLOGY | Facility: HOSPITAL | Age: 63
Discharge: HOME | End: 2018-07-30
Attending: UROLOGY
Payer: COMMERCIAL

## 2018-07-30 ENCOUNTER — ANESTHESIA (OUTPATIENT)
Dept: SURGERY | Facility: HOSPITAL | Age: 63
End: 2018-07-30
Payer: COMMERCIAL

## 2018-07-30 ENCOUNTER — HOSPITAL ENCOUNTER (OUTPATIENT)
Facility: HOSPITAL | Age: 63
Discharge: HOME | End: 2018-07-30
Attending: UROLOGY | Admitting: UROLOGY
Payer: COMMERCIAL

## 2018-07-30 ENCOUNTER — OFFICE VISIT (OUTPATIENT)
Dept: UROLOGY | Facility: CLINIC | Age: 63
End: 2018-07-30
Payer: COMMERCIAL

## 2018-07-30 VITALS
HEART RATE: 67 BPM | BODY MASS INDEX: 28.15 KG/M2 | HEIGHT: 69 IN | WEIGHT: 190.06 LBS | SYSTOLIC BLOOD PRESSURE: 142 MMHG | DIASTOLIC BLOOD PRESSURE: 82 MMHG

## 2018-07-30 VITALS
OXYGEN SATURATION: 100 % | HEIGHT: 69 IN | WEIGHT: 185 LBS | BODY MASS INDEX: 27.4 KG/M2 | SYSTOLIC BLOOD PRESSURE: 124 MMHG | DIASTOLIC BLOOD PRESSURE: 68 MMHG | HEART RATE: 53 BPM | RESPIRATION RATE: 18 BRPM | TEMPERATURE: 98 F

## 2018-07-30 DIAGNOSIS — N20.1 LEFT URETERAL STONE: ICD-10-CM

## 2018-07-30 DIAGNOSIS — N20.1 URETERAL STONE: ICD-10-CM

## 2018-07-30 DIAGNOSIS — N20.1 CALCULUS OF URETER: Primary | ICD-10-CM

## 2018-07-30 DIAGNOSIS — N13.30 HYDRONEPHROSIS, LEFT: ICD-10-CM

## 2018-07-30 DIAGNOSIS — N20.0 NEPHROLITHIASIS: ICD-10-CM

## 2018-07-30 DIAGNOSIS — N99.112 POSTPROCEDURAL MEMBRANOUS URETHRAL STRICTURE: ICD-10-CM

## 2018-07-30 PROCEDURE — 52356 CYSTO/URETERO W/LITHOTRIPSY: CPT | Mod: LT,,, | Performed by: UROLOGY

## 2018-07-30 PROCEDURE — 27201423 OPTIME MED/SURG SUP & DEVICES STERILE SUPPLY: Performed by: UROLOGY

## 2018-07-30 PROCEDURE — 74420 UROGRAPHY RTRGR +-KUB: CPT | Mod: 26,,, | Performed by: UROLOGY

## 2018-07-30 PROCEDURE — 36000706: Performed by: UROLOGY

## 2018-07-30 PROCEDURE — 37000009 HC ANESTHESIA EA ADD 15 MINS: Performed by: UROLOGY

## 2018-07-30 PROCEDURE — C1769 GUIDE WIRE: HCPCS | Performed by: UROLOGY

## 2018-07-30 PROCEDURE — 74018 RADEX ABDOMEN 1 VIEW: CPT | Mod: TC,PO

## 2018-07-30 PROCEDURE — 3079F DIAST BP 80-89 MM HG: CPT | Mod: CPTII,S$GLB,, | Performed by: UROLOGY

## 2018-07-30 PROCEDURE — C1894 INTRO/SHEATH, NON-LASER: HCPCS | Performed by: UROLOGY

## 2018-07-30 PROCEDURE — C1758 CATHETER, URETERAL: HCPCS | Performed by: UROLOGY

## 2018-07-30 PROCEDURE — 71000016 HC POSTOP RECOV ADDL HR: Performed by: UROLOGY

## 2018-07-30 PROCEDURE — 36000707: Performed by: UROLOGY

## 2018-07-30 PROCEDURE — 3077F SYST BP >= 140 MM HG: CPT | Mod: CPTII,S$GLB,, | Performed by: UROLOGY

## 2018-07-30 PROCEDURE — 63600175 PHARM REV CODE 636 W HCPCS: Performed by: NURSE ANESTHETIST, CERTIFIED REGISTERED

## 2018-07-30 PROCEDURE — C2617 STENT, NON-COR, TEM W/O DEL: HCPCS | Performed by: UROLOGY

## 2018-07-30 PROCEDURE — 52352 CYSTOURETERO W/STONE REMOVE: CPT | Mod: 22,59,RT, | Performed by: UROLOGY

## 2018-07-30 PROCEDURE — 25000003 PHARM REV CODE 250: Performed by: NURSE ANESTHETIST, CERTIFIED REGISTERED

## 2018-07-30 PROCEDURE — 74018 RADEX ABDOMEN 1 VIEW: CPT | Mod: 26,,, | Performed by: RADIOLOGY

## 2018-07-30 PROCEDURE — 37000008 HC ANESTHESIA 1ST 15 MINUTES: Performed by: UROLOGY

## 2018-07-30 PROCEDURE — D9220A PRA ANESTHESIA: Mod: ANES,,, | Performed by: ANESTHESIOLOGY

## 2018-07-30 PROCEDURE — 25000003 PHARM REV CODE 250: Performed by: UROLOGY

## 2018-07-30 PROCEDURE — 99214 OFFICE O/P EST MOD 30 MIN: CPT | Mod: 25,S$GLB,, | Performed by: UROLOGY

## 2018-07-30 PROCEDURE — 82365 CALCULUS SPECTROSCOPY: CPT

## 2018-07-30 PROCEDURE — 99999 PR PBB SHADOW E&M-EST. PATIENT-LVL III: CPT | Mod: PBBFAC,,, | Performed by: UROLOGY

## 2018-07-30 PROCEDURE — 71000044 HC DOSC ROUTINE RECOVERY FIRST HOUR: Performed by: UROLOGY

## 2018-07-30 PROCEDURE — 76000 FLUOROSCOPY <1 HR PHYS/QHP: CPT | Mod: 26,59,, | Performed by: UROLOGY

## 2018-07-30 PROCEDURE — D9220A PRA ANESTHESIA: Mod: CRNA,,, | Performed by: NURSE ANESTHETIST, CERTIFIED REGISTERED

## 2018-07-30 PROCEDURE — 3008F BODY MASS INDEX DOCD: CPT | Mod: CPTII,S$GLB,, | Performed by: UROLOGY

## 2018-07-30 PROCEDURE — 71000015 HC POSTOP RECOV 1ST HR: Performed by: UROLOGY

## 2018-07-30 PROCEDURE — 52332 CYSTOSCOPY AND TREATMENT: CPT | Mod: 59,RT,, | Performed by: UROLOGY

## 2018-07-30 PROCEDURE — 25000003 PHARM REV CODE 250: Performed by: ANESTHESIOLOGY

## 2018-07-30 DEVICE — STENT 6 X 26: Type: IMPLANTABLE DEVICE | Site: URETER | Status: FUNCTIONAL

## 2018-07-30 RX ORDER — FENTANYL CITRATE 50 UG/ML
25 INJECTION, SOLUTION INTRAMUSCULAR; INTRAVENOUS EVERY 5 MIN PRN
Status: DISCONTINUED | OUTPATIENT
Start: 2018-07-30 | End: 2018-07-30 | Stop reason: HOSPADM

## 2018-07-30 RX ORDER — ONDANSETRON 2 MG/ML
4 INJECTION INTRAMUSCULAR; INTRAVENOUS DAILY PRN
Status: DISCONTINUED | OUTPATIENT
Start: 2018-07-30 | End: 2018-07-30 | Stop reason: HOSPADM

## 2018-07-30 RX ORDER — HYDROMORPHONE HYDROCHLORIDE 1 MG/ML
0.2 INJECTION, SOLUTION INTRAMUSCULAR; INTRAVENOUS; SUBCUTANEOUS EVERY 5 MIN PRN
Status: DISCONTINUED | OUTPATIENT
Start: 2018-07-30 | End: 2018-07-30 | Stop reason: HOSPADM

## 2018-07-30 RX ORDER — MIDAZOLAM HYDROCHLORIDE 1 MG/ML
INJECTION INTRAMUSCULAR; INTRAVENOUS
Status: DISCONTINUED | OUTPATIENT
Start: 2018-07-30 | End: 2018-07-30

## 2018-07-30 RX ORDER — OXYCODONE AND ACETAMINOPHEN 5; 325 MG/1; MG/1
1 TABLET ORAL EVERY 6 HOURS PRN
Qty: 10 TABLET | Refills: 0 | Status: SHIPPED | OUTPATIENT
Start: 2018-07-30 | End: 2021-05-18

## 2018-07-30 RX ORDER — METOCLOPRAMIDE HYDROCHLORIDE 5 MG/ML
10 INJECTION INTRAMUSCULAR; INTRAVENOUS EVERY 10 MIN PRN
Status: DISCONTINUED | OUTPATIENT
Start: 2018-07-30 | End: 2018-07-30 | Stop reason: HOSPADM

## 2018-07-30 RX ORDER — PROPOFOL 10 MG/ML
VIAL (ML) INTRAVENOUS
Status: DISCONTINUED | OUTPATIENT
Start: 2018-07-30 | End: 2018-07-30

## 2018-07-30 RX ORDER — ATROPA BELLADONNA AND OPIUM 16.2; 3 MG/1; MG/1
SUPPOSITORY RECTAL
Status: DISCONTINUED
Start: 2018-07-30 | End: 2018-07-30 | Stop reason: HOSPADM

## 2018-07-30 RX ORDER — KETOROLAC TROMETHAMINE 10 MG/1
TABLET, FILM COATED ORAL
COMMUNITY
Start: 2018-07-27 | End: 2021-05-18

## 2018-07-30 RX ORDER — LIDOCAINE HYDROCHLORIDE 20 MG/ML
JELLY TOPICAL
Status: DISCONTINUED | OUTPATIENT
Start: 2018-07-30 | End: 2018-07-30 | Stop reason: HOSPADM

## 2018-07-30 RX ORDER — PHENYLEPHRINE HYDROCHLORIDE 10 MG/ML
INJECTION INTRAVENOUS
Status: DISCONTINUED | OUTPATIENT
Start: 2018-07-30 | End: 2018-07-30

## 2018-07-30 RX ORDER — SODIUM CHLORIDE 9 MG/ML
INJECTION, SOLUTION INTRAVENOUS CONTINUOUS
Status: DISCONTINUED | OUTPATIENT
Start: 2018-07-30 | End: 2018-07-30 | Stop reason: HOSPADM

## 2018-07-30 RX ORDER — SODIUM CHLORIDE 0.9 % (FLUSH) 0.9 %
3 SYRINGE (ML) INJECTION
Status: DISCONTINUED | OUTPATIENT
Start: 2018-07-30 | End: 2018-07-30 | Stop reason: HOSPADM

## 2018-07-30 RX ORDER — ROCURONIUM BROMIDE 10 MG/ML
INJECTION, SOLUTION INTRAVENOUS
Status: DISCONTINUED | OUTPATIENT
Start: 2018-07-30 | End: 2018-07-30

## 2018-07-30 RX ORDER — HYDROCODONE BITARTRATE AND ACETAMINOPHEN 5; 325 MG/1; MG/1
TABLET ORAL
COMMUNITY
Start: 2018-07-27 | End: 2021-05-18

## 2018-07-30 RX ORDER — NEOSTIGMINE METHYLSULFATE 1 MG/ML
INJECTION, SOLUTION INTRAVENOUS
Status: DISCONTINUED | OUTPATIENT
Start: 2018-07-30 | End: 2018-07-30

## 2018-07-30 RX ORDER — TAMSULOSIN HYDROCHLORIDE 0.4 MG/1
CAPSULE ORAL
COMMUNITY
Start: 2018-07-27 | End: 2022-02-23

## 2018-07-30 RX ORDER — GLYCOPYRROLATE 0.2 MG/ML
INJECTION INTRAMUSCULAR; INTRAVENOUS
Status: DISCONTINUED | OUTPATIENT
Start: 2018-07-30 | End: 2018-07-30

## 2018-07-30 RX ORDER — ONDANSETRON HYDROCHLORIDE 2 MG/ML
INJECTION, SOLUTION INTRAMUSCULAR; INTRAVENOUS
Status: DISCONTINUED | OUTPATIENT
Start: 2018-07-30 | End: 2018-07-30

## 2018-07-30 RX ORDER — FENTANYL CITRATE 50 UG/ML
INJECTION, SOLUTION INTRAMUSCULAR; INTRAVENOUS
Status: DISCONTINUED | OUTPATIENT
Start: 2018-07-30 | End: 2018-07-30

## 2018-07-30 RX ORDER — LIDOCAINE HCL/PF 100 MG/5ML
SYRINGE (ML) INTRAVENOUS
Status: DISCONTINUED | OUTPATIENT
Start: 2018-07-30 | End: 2018-07-30

## 2018-07-30 RX ORDER — LIDOCAINE HYDROCHLORIDE 10 MG/ML
1 INJECTION, SOLUTION EPIDURAL; INFILTRATION; INTRACAUDAL; PERINEURAL ONCE
Status: COMPLETED | OUTPATIENT
Start: 2018-07-30 | End: 2018-07-30

## 2018-07-30 RX ADMIN — ROCURONIUM BROMIDE 10 MG: 10 INJECTION, SOLUTION INTRAVENOUS at 01:07

## 2018-07-30 RX ADMIN — ROCURONIUM BROMIDE 20 MG: 10 INJECTION, SOLUTION INTRAVENOUS at 01:07

## 2018-07-30 RX ADMIN — FENTANYL CITRATE 50 MCG: 50 INJECTION, SOLUTION INTRAMUSCULAR; INTRAVENOUS at 12:07

## 2018-07-30 RX ADMIN — NEOSTIGMINE METHYLSULFATE 5 MG: 1 INJECTION INTRAVENOUS at 03:07

## 2018-07-30 RX ADMIN — SODIUM CHLORIDE: 0.9 INJECTION, SOLUTION INTRAVENOUS at 11:07

## 2018-07-30 RX ADMIN — ROCURONIUM BROMIDE 10 MG: 10 INJECTION, SOLUTION INTRAVENOUS at 02:07

## 2018-07-30 RX ADMIN — LIDOCAINE HYDROCHLORIDE 100 MG: 20 INJECTION, SOLUTION INTRAVENOUS at 12:07

## 2018-07-30 RX ADMIN — PHENYLEPHRINE HYDROCHLORIDE 100 MCG: 10 INJECTION INTRAVENOUS at 02:07

## 2018-07-30 RX ADMIN — PROPOFOL 30 MG: 10 INJECTION, EMULSION INTRAVENOUS at 01:07

## 2018-07-30 RX ADMIN — ROCURONIUM BROMIDE 30 MG: 10 INJECTION, SOLUTION INTRAVENOUS at 12:07

## 2018-07-30 RX ADMIN — PROPOFOL 200 MG: 10 INJECTION, EMULSION INTRAVENOUS at 12:07

## 2018-07-30 RX ADMIN — ONDANSETRON 4 MG: 2 INJECTION, SOLUTION INTRAMUSCULAR; INTRAVENOUS at 01:07

## 2018-07-30 RX ADMIN — PHENYLEPHRINE HYDROCHLORIDE 200 MCG: 10 INJECTION INTRAVENOUS at 02:07

## 2018-07-30 RX ADMIN — GLYCOPYRROLATE 0.2 MG: 0.2 INJECTION INTRAMUSCULAR; INTRAVENOUS at 02:07

## 2018-07-30 RX ADMIN — GLYCOPYRROLATE 0.6 MG: 0.2 INJECTION INTRAMUSCULAR; INTRAVENOUS at 03:07

## 2018-07-30 RX ADMIN — MIDAZOLAM HYDROCHLORIDE 2 MG: 1 INJECTION, SOLUTION INTRAMUSCULAR; INTRAVENOUS at 12:07

## 2018-07-30 RX ADMIN — SODIUM CHLORIDE, SODIUM GLUCONATE, SODIUM ACETATE, POTASSIUM CHLORIDE, MAGNESIUM CHLORIDE, SODIUM PHOSPHATE, DIBASIC, AND POTASSIUM PHOSPHATE: .53; .5; .37; .037; .03; .012; .00082 INJECTION, SOLUTION INTRAVENOUS at 01:07

## 2018-07-30 RX ADMIN — LIDOCAINE HYDROCHLORIDE 0.2 MG: 10 INJECTION, SOLUTION EPIDURAL; INFILTRATION; INTRACAUDAL; PERINEURAL at 11:07

## 2018-07-30 NOTE — INTERVAL H&P NOTE
The patient has been examined and the H&P has been reviewed:    I concur with the findings and no changes have occurred since H&P was written.     UA positive for blood, negative for leuks, nitrites    Anesthesia/Surgery risks, benefits and alternative options discussed and understood by patient/family.          There are no hospital problems to display for this patient.

## 2018-07-30 NOTE — DISCHARGE INSTRUCTIONS
Please remove stents by grabbing strings and pulling with gentle force downward until entire stent has been extracted from urethra.         Treating Kidney Stones: Ureteroscopic Stone Removal    Ureteroscopic stone removal may be done before, after, or instead of other treatments. If you need this procedure, your healthcare provider will discuss its risks and possible complications. You will be told how to prepare. And you will be told about anesthesia that will keep you pain-free during treatment.     A ureteroscope lets your doctor see your stone before removing it.   Removing the stone through the ureter  Ureteroscopic stone removal extracts a small stone in your ureter without an incision. Your doctor places a viewing tube (ureteroscope) in your ureter. A wire basket inserted through the tube removes the stone. Sometimes, a laser or a mechanical device is used to break up the stone. A soft tube may be left in your ureter briefly to drain urine.     The stone may be fragmented. The stone is then withdrawn or passed.   Your recovery  This is an outpatient or overnight procedure. For a few days after surgery, you may feel some pain when you urinate. Or you may need to urinate more often, or have bloody urine. You may have a ureteral stent. This is a soft tube that prevents blockage from swelling after the procedure. The stent is removed when the swelling goes down, often within days. Follow up as instructed to check for any new stones.  When to call your healthcare provider  Call your healthcare provider right away if:  · You have sudden pain or flank pain  · You have a fever over 100.4°F (38°C)  · You have nausea that lasts for days  · You have heavy bleeding when you urinate  · You have heavy bleeding through your drainage tube  · You have swelling or redness around your incision   Date Last Reviewed: 2/1/2017  © 9508-0169 [employer]. [address] [place] [address], [place] [address]. All rights reserved.  This information is not intended as a substitute for professional medical care. Always follow your healthcare professional's instructions.      PATIENT INSTRUCTIONS  POST-ANESTHESIA    IMMEDIATELY FOLLOWING SURGERY:  Do not drive or operate machinery for the first twenty four hours after surgery.  Do not make any important decisions for twenty four hours after surgery or while taking narcotic pain medications or sedatives.  If you develop intractable nausea and vomiting or a severe headache please notify your doctor immediately.    FOLLOW-UP:  Please make an appointment with your surgeon as instructed. You do not need to follow up with anesthesia unless specifically instructed to do so.    WOUND CARE INSTRUCTIONS (if applicable):  Keep a dry clean dressing on the anesthesia/puncture wound site if there is drainage.  Once the wound has quit draining you may leave it open to air.  Generally you should leave the bandage intact for twenty four hours unless there is drainage.  If the epidural site drains for more than 36-48 hours please call the anesthesia department.    QUESTIONS?:  Please feel free to call your physician or the hospital  if you have any questions, and they will be happy to assist you.       [facility] Anesthesia Department  [address] [facility]  [phone]

## 2018-07-30 NOTE — TRANSFER OF CARE
"Anesthesia Transfer of Care Note    Patient: Ruel Santacruz MD    Procedure(s) Performed: Procedure(s) (LRB):  REMOVAL, CALCULUS, URETER, URETEROSCOPIC (Bilateral)  CYSTOSCOPY (Left)  LITHOTRIPSY, USING LASER (Left)  PLACEMENT-STENT (Bilateral)  PYELOGRAM, RETROGRADE (Bilateral)  URETEROSCOPY (Bilateral)    Patient location: [facility]    Anesthesia Type: general    Transport from OR: Transported from OR on 6-10 L/min O2 by face mask with adequate spontaneous ventilation    Post pain: adequate analgesia    Post assessment: no apparent anesthetic complications and tolerated procedure well    Post vital signs: stable    Level of consciousness: awake    Nausea/Vomiting: no nausea/vomiting    Complications: none    Transfer of care protocol was followed      Last vitals:   Visit Vitals  BP (!) 143/81   Pulse 71   Temp 37.5 °C (99.5 °F)   Resp 16   Ht 5' 9" (1.753 m)   Wt 83.9 kg (185 lb)   SpO2 97%   BMI 27.32 kg/m²     "

## 2018-07-30 NOTE — ANESTHESIA PREPROCEDURE EVALUATION
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    Anesthesia Evaluation         Review of Systems  Anesthesia Hx:  No problems with previous Anesthesia   Cardiovascular:  Cardiovascular Normal     Pulmonary:  Pulmonary Normal    Renal/:   Chronic Renal Disease    Neurological:  Neurology Normal    Endocrine:  Endocrine Normal        Physical Exam  General:  Well nourished    Airway/Jaw/Neck:  Airway Findings: Mouth Opening: Normal Tongue: Normal  General Airway Assessment: Adult  Mallampati: II  TM Distance: Normal, at least 6 cm  Jaw/Neck Findings:     Neck ROM: Normal ROM      Dental:  Dental Findings: In tact   Chest/Lungs:  Chest/Lungs Findings: Clear to auscultation, Normal Respiratory Rate     Heart/Vascular:  Heart Findings: Rate: Normal  Rhythm: Regular Rhythm  Sounds: Normal        Mental Status:  Mental Status Findings:  Cooperative, Alert and Oriented         Anesthesia Plan  Type of Anesthesia, risks & benefits discussed:  Anesthesia Type:  general  Patient's Preference:   Intra-op Monitoring Plan: standard ASA monitors  Intra-op Monitoring Plan Comments:   Post Op Pain Control Plan: multimodal analgesia, IV/PO Opioids PRN and per primary service following discharge from PACU  Post Op Pain Control Plan Comments:   Induction:   IV  Beta Blocker:  Patient is not currently on a Beta-Blocker (No further documentation required).       Informed Consent: Patient understands risks and agrees with Anesthesia plan.  Questions answered. Anesthesia consent signed with patient.  ASA Score: 1     Day of Surgery Review of History & Physical:            Ready For Surgery From Anesthesia Perspective.

## 2018-07-30 NOTE — PROGRESS NOTES
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  He was [place]ing at the beach and had an episode of left flank pain with increased urgency and frequency of urination.  It reminded him of his recent kidney stone problem affected on the right side.    He had a large left lower pole kidney stone that we have been following.  He had a CT scan done at an outside ER on 7/27/18.  Denies any more right renal colic, fever, chills or hematuria.            Lab Results  Lab Results       Component                Value               Date                       PSA                      0.37                09/28/2013                 PSA                      0.52                08/21/2012                 PSA                      0.49                10/18/2011                 PSADIAG                  0.48                06/02/2017                 PSADIAG                  0.33                01/05/2016                 PSADIAG                  0.48                01/07/2015              Creatine 1.6 normalized to 1.2 to his normal baseline.      Past Medical History:    Neutropenia                                                   Sensorineural hearing loss, unspecified                       Tear of lateral cartilage or meniscus of knee,*               Thrombocytopenia                                              Past Surgical History:    APPENDECTOMY                                                   KNEE SURGERY                                     4/2012          Comment:left knee- arthroscopic-- torn meniscus    BCC- nose                                                      Review of patient's family history indicates:    Cancer                         Mother                       Comment: breast    Cancer                         Father                      Comment: prostate    Cancer                         Brother                     Comment: prostate      Social History    Marital Status:              Spouse Name:                       Years of Education:                 Number of children:               Occupational History    None on file    Social History Main Topics    Smoking Status: Never Smoker                      Smokeless Status: Not on file                       Alcohol Use: No              Drug Use: Not on file     Sexual Activity: Not on file          Other Topics            Concern    None on file    Social History Narrative         Spouse in good health     3 children , 1 son, 2 dtrs        Allergies:  Review of patient's allergies indicates no known allergies.    Medications:  Current outpatient prescriptions:   valacyclovir (VALTREX) 1000 MG tablet, , Disp: , Rfl:                   Medication Refill   Pertinent negatives include no abdominal pain, chest pain, fever, headaches, nausea or vomiting.     Review of Systems   Constitutional: Negative.  Negative for activity change, appetite change and fever.   HENT: Negative.  Negative for facial swelling and trouble swallowing.    Eyes: Negative.    Respiratory: Negative.  Negative for shortness of breath.    Cardiovascular: Negative.  Negative for chest pain and palpitations.   Gastrointestinal: Negative for abdominal pain, constipation, diarrhea, nausea and vomiting.   Genitourinary: Negative for difficulty urinating, dysuria, enuresis, flank pain, frequency, genital sores, hematuria, nocturia, penile pain, scrotal swelling, testicular pain and urgency.        Bello draining;   Distal penis discomfort.     Musculoskeletal: Negative for back pain, gait problem and neck stiffness.   Skin: Negative.  Negative for wound.   Neurological: Negative for dizziness, tremors, seizures, syncope, speech  difficulty, light-headedness and headaches.   Hematological: Does not bruise/bleed easily.   Psychiatric/Behavioral: Negative for confusion and hallucinations. The patient is not nervous/anxious.        Objective:      Physical Exam   Nursing note and vitals reviewed.  Constitutional: He is oriented to person, place, and time. Vital signs are normal. He appears well-developed and well-nourished. He is active and cooperative.  Non-toxic appearance. He does not have a sickly appearance.   HENT:   Head: Normocephalic and atraumatic.   Right Ear: External ear normal.   Left Ear: External ear normal.   Nose: Nose normal.   Mouth/Throat: Mucous membranes are normal.   Eyes: Conjunctivae and lids are normal. No scleral icterus.   Neck: Trachea normal, normal range of motion and full passive range of motion without pain. Neck supple. No JVD present. No tracheal deviation present.   Cardiovascular: Normal rate, regular rhythm, S1 normal and S2 normal.    Pulmonary/Chest: Effort normal and breath sounds normal. No respiratory distress. He exhibits no tenderness.   Abdominal: Soft. Normal appearance and bowel sounds are normal. There is no hepatosplenomegaly. There is no tenderness. There is no rebound, no guarding and no CVA tenderness.   Genitourinary: Testes normal. Right testis shows no mass, no swelling and no tenderness. Left testis shows no mass, no swelling and no tenderness. Circumcised. No hypospadias, penile erythema or penile tenderness. No discharge found.   Musculoskeletal: Normal range of motion.   Neurological: He is alert and oriented to person, place, and time. He has normal strength.   Skin: Skin is warm, dry and intact.     Psychiatric: He has a normal mood and affect. His behavior is normal. Judgment and thought content normal.       KUB 7/6/18  11 mm nonobstructing left renal calculus, not significantly changed allowing for differences in projection..    CT RSS 7/6/18  Kidneys demonstrate normal cortical  thickness.  Bilateral nonobstructing renal calculi are present.  The left renal calculus lies at the lower pole of the collecting system and measures approximately 9 mm.  The right renal calculus also lies at the lower pole and measures approximately 14 mm.  The 1 on the left appear stable.  The stone burden on the right may have increased increase or reflect multiple stable adjacent calculi.  No ureteral calculi are detected but there is bilateral hydroureter and mild bilateral pelvicaliectasis.  The bladder is incompletely distended and not well evaluated but the bladder wall appears mildly thickened diffusely.    CT on 7/27/18  Large calculus ( 8.5 mm) within the distal left ureter with severe hydronephrosis of the left kidney.    Assessment:       Ruel was seen today for nephrolithiasis.    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  He has been NPO today.  Will send him to [facility] and ask Dr. Schneider to take care of his obstructive large distal ureteral stone today.    I spent 25 minutes with the patient of which more than half was spent in direct consultation with the patient in regards to our treatment and plan.      Follow-up for emergent left ureteral stone removal, cysto and left ureteral stent placement.

## 2018-07-30 NOTE — DISCHARGE SUMMARY
[facility]  Discharge Note  Short Stay    Admit Date: 7/30/2018    Discharge Date and Time: 07/30/2018 3:19 PM      Attending Physician: Farida Schneider MD     Discharge Provider: Theodore Portillo    Diagnoses:  Active Hospital Problems    Diagnosis  POA    *Calculus of ureter [N20.1]  Unknown      Resolved Hospital Problems    Diagnosis Date Resolved POA   No resolved problems to display.       Discharged Condition: good    Hospital Course: Patient was admitted for ureteroscopy and tolerated the procedure well with no complications. The patient was discharged home in good condition on the same day.       Final Diagnoses: Same as principal problem.    Disposition: Home or Self Care    Follow up/Patient Instructions:    Medications:  Reconciled Home Medications: Current Discharge Medication List      CONTINUE these medications which have CHANGED    Details   oxyCODONE-acetaminophen (PERCOCET) 5-325 mg per tablet Take 1 tablet by mouth every 6 (six) hours as needed for Pain.  Qty: 10 tablet, Refills: 0    Associated Diagnoses: Postprocedural membranous urethral stricture         CONTINUE these medications which have NOT CHANGED    Details   allopurinol (ZYLOPRIM) 100 MG tablet Take 1 tablet (100 mg total) by mouth once daily.  Qty: 30 tablet, Refills: 3    Associated Diagnoses: Hyperuricemia      ketorolac (TORADOL) 10 mg tablet       potassium citrate (UROCIT-K 10) 10 mEq (1,080 mg) TbSR Take 1 tablet (10 mEq total) by mouth 3 (three) times daily with meals.  Qty: 90 tablet, Refills: 11    Associated Diagnoses: Nephrolithiasis; Hyperuricemia      tamsulosin (FLOMAX) 0.4 mg Cap       HYDROcodone-acetaminophen (NORCO) 5-325 mg per tablet       sildenafil (VIAGRA) 100 MG tablet Take 100 mg by mouth daily as needed for Erectile Dysfunction.      tadalafil (CIALIS) 20 MG Tab Take 1 tablet (20 mg total) by mouth as needed. 1 tab as needed every 36 hours  Qty: 8 tablet, Refills: 11    Associated Diagnoses: ED  (erectile dysfunction) of organic origin             Discharge Procedure Orders  Call MD for:  persistent nausea and vomiting or diarrhea     Call MD for:  severe uncontrolled pain     Call MD for:  persistent dizziness, light-headedness, or visual disturbances     Call MD for:   Order Comments: Temperature > 101F       Follow-up Information     Jonathan Ojeda MD In 3 weeks.    Specialty:  Urology  Contact information:  [address]  [facility] [address]  [phone]                   Discharge Procedure Orders (must include Diet, Follow-up, Activity):    Discharge Procedure Orders (must include Diet, Follow-up, Activity)  Call MD for:  persistent nausea and vomiting or diarrhea     Call MD for:  severe uncontrolled pain     Call MD for:  persistent dizziness, light-headedness, or visual disturbances     Call MD for:   Order Comments: Temperature > 101F

## 2018-07-31 NOTE — ANESTHESIA POSTPROCEDURE EVALUATION
"Anesthesia Post Evaluation    Patient: Ruel Santacruz MD    Procedure(s) Performed: Procedure(s) (LRB):  REMOVAL, CALCULUS, URETER, URETEROSCOPIC (Bilateral)  CYSTOSCOPY (Left)  LITHOTRIPSY, USING LASER (Left)  PLACEMENT-STENT (Bilateral)  PYELOGRAM, RETROGRADE (Bilateral)  URETEROSCOPY (Bilateral)    Final Anesthesia Type: general  Patient location during evaluation: PACU  Patient participation: Yes- Able to Participate  Level of consciousness: awake and alert and oriented  Post-procedure vital signs: reviewed and stable  Pain management: adequate  Airway patency: patent  PONV status at discharge: No PONV  Anesthetic complications: no      Cardiovascular status: blood pressure returned to baseline and hemodynamically stable  Respiratory status: unassisted and spontaneous ventilation  Hydration status: euvolemic  Follow-up not needed.  Comments: Late entry of note        Visit Vitals  /68   Pulse (!) 53   Temp 36.6 °C (97.9 °F) (Temporal)   Resp 18   Ht 5' 9" (1.753 m)   Wt 83.9 kg (185 lb)   SpO2 100%   BMI 27.32 kg/m²       Pain/Joan Score: Pain Assessment Performed: Yes (7/30/2018  4:30 PM)  Presence of Pain: denies (7/30/2018  4:30 PM)  Joan Score: 10 (7/30/2018  4:30 PM)      "

## 2018-08-02 ENCOUNTER — TELEPHONE (OUTPATIENT)
Dept: UROLOGY | Facility: CLINIC | Age: 63
End: 2018-08-02

## 2018-08-02 LAB
ANNOTATION COMMENT IMP: NORMAL
COMPN STONE: NORMAL
SPECIMEN SOURCE: NORMAL
STONE ANALYSIS IR-IMP: NORMAL

## 2018-08-02 NOTE — TELEPHONE ENCOUNTER
----- Message from Debbi Jurado LPN sent at 8/1/2018  3:12 PM CDT -----      ----- Message -----  From: Gosia Romero  Sent: 8/1/2018   2:11 PM  To: [facility] Staff    Patient Requesting Sooner Appointment.     Reason for sooner appt.: 3 wks post op  When is the first available appointment? 09/10/18  Communication Preference: [phone]  Additional Information:    Pt requesting 08/20/18 in the afternoon at [place]

## 2018-08-07 ENCOUNTER — PATIENT MESSAGE (OUTPATIENT)
Dept: UROLOGY | Facility: CLINIC | Age: 63
End: 2018-08-07

## 2018-08-07 DIAGNOSIS — N20.0 KIDNEY STONES: Primary | ICD-10-CM

## 2018-08-07 NOTE — TELEPHONE ENCOUNTER
Please book for ultrasound early sept. For f/u ureteroscopy  Needs pth, uric acid also and 24 hour urine.

## 2018-08-20 ENCOUNTER — OFFICE VISIT (OUTPATIENT)
Dept: UROLOGY | Facility: CLINIC | Age: 63
End: 2018-08-20
Payer: COMMERCIAL

## 2018-08-20 VITALS
SYSTOLIC BLOOD PRESSURE: 132 MMHG | HEIGHT: 69 IN | WEIGHT: 191.56 LBS | BODY MASS INDEX: 28.37 KG/M2 | DIASTOLIC BLOOD PRESSURE: 82 MMHG | HEART RATE: 62 BPM

## 2018-08-20 DIAGNOSIS — N20.0 NEPHROLITHIASIS: Primary | ICD-10-CM

## 2018-08-20 PROCEDURE — 99999 PR PBB SHADOW E&M-EST. PATIENT-LVL IV: CPT | Mod: PBBFAC,,, | Performed by: UROLOGY

## 2018-08-20 PROCEDURE — 3008F BODY MASS INDEX DOCD: CPT | Mod: CPTII,S$GLB,, | Performed by: UROLOGY

## 2018-08-20 PROCEDURE — 99214 OFFICE O/P EST MOD 30 MIN: CPT | Mod: S$GLB,,, | Performed by: UROLOGY

## 2018-08-20 PROCEDURE — 3075F SYST BP GE 130 - 139MM HG: CPT | Mod: CPTII,S$GLB,, | Performed by: UROLOGY

## 2018-08-20 PROCEDURE — 3079F DIAST BP 80-89 MM HG: CPT | Mod: CPTII,S$GLB,, | Performed by: UROLOGY

## 2018-08-20 NOTE — PROGRESS NOTES
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    He underwent the following surgery on 7/30/18 for with a symptomatic left lower third ureteral stone and a right lower pole renal stone.     Procedure(s) Performed by Dr. Schneider.  1.  Bilateral ureteroscopy with laser lithotripsy and stone basket extraction  2.  Bilateral retrograde pyelograms  3.  Bilateral JJ ureteral stent insertion  4.  Fluoro < 1 h with radiographic interpretation  5. 22 modifier for right stone burden (over 20 small stones, that had to be individually removed)  Findings:   Bilateral ureters with dilation, more pronounced distally, and moderate hydronephrosis.   Left distal ureteral stone encountered and fragmented with laser. Fragments removed with basket.   Right sided renal stones basketed and removed.   Bilateral JJ ureteral stents with strings placed.            Stone analysis so far showed calcium oxalate.  However, he might have uric acid ureteral stone as well based on his previous radiographic studies.  Currently he is on Urocit K and allopurinol.  Dr. Schneider ordered PTH, which came back slight elevated, and post op US which was not done yet.  Denies any pain, hematuria or dysuria.  He managed to remove both ureteral stents by its string following his surgery.    Lab Results  Lab Results       Component                Value               Date                       PSA                      0.37                09/28/2013                 PSA                      0.52                08/21/2012                 PSA                      0.49                10/18/2011                 PSADIAG                  0.48                06/02/2017                  PSADIAG                  0.33                01/05/2016                 PSADIAG                  0.48                01/07/2015              Creatine 1.6 normalized to 1.2 to his normal baseline.      Past Medical History:    Neutropenia                                                   Sensorineural hearing loss, unspecified                       Tear of lateral cartilage or meniscus of knee,*               Thrombocytopenia                                              Past Surgical History:    APPENDECTOMY                                                   KNEE SURGERY                                     4/2012          Comment:left knee- arthroscopic-- torn meniscus    BCC- nose                                                      Review of patient's family history indicates:    Cancer                         Mother                      Comment: breast    Cancer                         Father                      Comment: prostate    Cancer                         Brother                     Comment: prostate      Social History    Marital Status:              Spouse Name:                       Years of Education:                 Number of children:               Occupational History    None on file    Social History Main Topics    Smoking Status: Never Smoker                      Smokeless Status: Not on file                       Alcohol Use: No              Drug Use: Not on file     Sexual Activity: Not on file          Other Topics            Concern    None on file    Social History Narrative         Spouse in good health     3 children , 1 son, 2 dtrs        Allergies:  Review of patient's allergies indicates no known allergies.    Medications:  Current outpatient prescriptions:   valacyclovir (VALTREX) 1000 MG tablet, , Disp: , Rfl:                   Medication Refill   Pertinent negatives include no abdominal pain, chest pain, fever, headaches, nausea or vomiting.     Review of Systems    Constitutional: Negative.  Negative for activity change, appetite change and fever.   HENT: Negative.  Negative for facial swelling and trouble swallowing.    Eyes: Negative.    Respiratory: Negative.  Negative for shortness of breath.    Cardiovascular: Negative.  Negative for chest pain and palpitations.   Gastrointestinal: Negative for abdominal pain, constipation, diarrhea, nausea and vomiting.   Genitourinary: Negative for difficulty urinating, dysuria, enuresis, flank pain, frequency, genital sores, hematuria, nocturia, penile pain, scrotal swelling, testicular pain and urgency.        Bello draining;   Distal penis discomfort.     Musculoskeletal: Negative for back pain, gait problem and neck stiffness.   Skin: Negative.  Negative for wound.   Neurological: Negative for dizziness, tremors, seizures, syncope, speech difficulty, light-headedness and headaches.   Hematological: Does not bruise/bleed easily.   Psychiatric/Behavioral: Negative for confusion and hallucinations. The patient is not nervous/anxious.        Objective:      Physical Exam   Nursing note and vitals reviewed.  Constitutional: He is oriented to person, place, and time. Vital signs are normal. He appears well-developed and well-nourished. He is active and cooperative.  Non-toxic appearance. He does not have a sickly appearance.   HENT:   Head: Normocephalic and atraumatic.   Right Ear: External ear normal.   Left Ear: External ear normal.   Nose: Nose normal.   Mouth/Throat: Mucous membranes are normal.   Eyes: Conjunctivae and lids are normal. No scleral icterus.   Neck: Trachea normal, normal range of motion and full passive range of motion without pain. Neck supple. No JVD present. No tracheal deviation present.   Cardiovascular: Normal rate, regular rhythm, S1 normal and S2 normal.    Pulmonary/Chest: Effort normal and breath sounds normal. No respiratory distress. He exhibits no tenderness.   Abdominal: Soft. Normal appearance and  bowel sounds are normal. There is no hepatosplenomegaly. There is no tenderness. There is no rebound, no guarding and no CVA tenderness.   Genitourinary: Testes normal. Right testis shows no mass, no swelling and no tenderness. Left testis shows no mass, no swelling and no tenderness. Circumcised. No hypospadias, penile erythema or penile tenderness. No discharge found.   Musculoskeletal: Normal range of motion.   Neurological: He is alert and oriented to person, place, and time. He has normal strength.   Skin: Skin is warm, dry and intact.     Psychiatric: He has a normal mood and affect. His behavior is normal. Judgment and thought content normal.       KUB 7/6/18  11 mm nonobstructing left renal calculus, not significantly changed allowing for differences in projection..    CT RSS 7/6/18  Kidneys demonstrate normal cortical thickness.  Bilateral nonobstructing renal calculi are present.  The left renal calculus lies at the lower pole of the collecting system and measures approximately 9 mm.  The right renal calculus also lies at the lower pole and measures approximately 14 mm.  The 1 on the left appear stable.  The stone burden on the right may have increased increase or reflect multiple stable adjacent calculi.  No ureteral calculi are detected but there is bilateral hydroureter and mild bilateral pelvicaliectasis.  The bladder is incompletely distended and not well evaluated but the bladder wall appears mildly thickened diffusely.    CT on 7/27/18  Large calculus ( 8.5 mm) within the distal left ureter with severe hydronephrosis of the left kidney.    Assessment:       Ruel was seen today for post-op evaluation.    Diagnoses and all orders for this visit:    Nephrolithiasis  -     X-Ray Abdomen AP 1 View; Future      Plan:           KUB, US  24 hour urine study ( [facility])  F/u with endocrinologist  F/U with nephrologist  Continue allopurinol and Urocit K at present time.  Drink plenty of water to maintain 2  liters of UO a day.    I spent 25 minutes with the patient of which more than half was spent in direct consultation with the patient in regards to our treatment and plan.      Follow-up if symptoms worsen or fail to improve.

## 2020-04-23 DIAGNOSIS — Z00.00 PREVENTATIVE HEALTH CARE: Primary | ICD-10-CM

## 2020-09-15 ENCOUNTER — TELEPHONE (OUTPATIENT)
Dept: ENDOSCOPY | Facility: HOSPITAL | Age: 65
End: 2020-09-15

## 2020-09-15 DIAGNOSIS — Z01.812 PRE-PROCEDURE LAB EXAM: Primary | ICD-10-CM

## 2020-09-15 DIAGNOSIS — Z12.11 SPECIAL SCREENING FOR MALIGNANT NEOPLASMS, COLON: Primary | ICD-10-CM

## 2020-09-15 RX ORDER — CHOLECALCIFEROL (VITAMIN D3) 25 MCG
1000 TABLET ORAL DAILY
COMMUNITY

## 2020-09-15 RX ORDER — SODIUM, POTASSIUM,MAG SULFATES 17.5-3.13G
1 SOLUTION, RECONSTITUTED, ORAL ORAL DAILY
Qty: 1 KIT | Refills: 0 | Status: SHIPPED | OUTPATIENT
Start: 2020-09-15 | End: 2020-09-17

## 2020-10-02 ENCOUNTER — ANESTHESIA (OUTPATIENT)
Dept: ENDOSCOPY | Facility: HOSPITAL | Age: 65
End: 2020-10-02
Payer: COMMERCIAL

## 2020-10-02 ENCOUNTER — ANESTHESIA EVENT (OUTPATIENT)
Dept: ENDOSCOPY | Facility: HOSPITAL | Age: 65
End: 2020-10-02
Payer: COMMERCIAL

## 2020-10-02 ENCOUNTER — HOSPITAL ENCOUNTER (OUTPATIENT)
Facility: HOSPITAL | Age: 65
Discharge: HOME | End: 2020-10-02
Attending: INTERNAL MEDICINE | Admitting: INTERNAL MEDICINE
Payer: COMMERCIAL

## 2020-10-02 VITALS
SYSTOLIC BLOOD PRESSURE: 124 MMHG | HEART RATE: 68 BPM | RESPIRATION RATE: 15 BRPM | TEMPERATURE: 98 F | OXYGEN SATURATION: 99 % | BODY MASS INDEX: 37.3 KG/M2 | HEIGHT: 60 IN | WEIGHT: 190 LBS | DIASTOLIC BLOOD PRESSURE: 86 MMHG

## 2020-10-02 DIAGNOSIS — Z12.11 ENCOUNTER FOR SCREENING COLONOSCOPY: Primary | ICD-10-CM

## 2020-10-02 PROCEDURE — 37000009 HC ANESTHESIA EA ADD 15 MINS: Performed by: INTERNAL MEDICINE

## 2020-10-02 PROCEDURE — 88305 TISSUE EXAM BY PATHOLOGIST: CPT | Mod: 26,,, | Performed by: PATHOLOGY

## 2020-10-02 PROCEDURE — E9220 PRA ENDO ANESTHESIA: ICD-10-PCS | Mod: 33,,, | Performed by: STUDENT IN AN ORGANIZED HEALTH CARE EDUCATION/TRAINING PROGRAM

## 2020-10-02 PROCEDURE — 45385 PR COLONOSCOPY,REMV LESN,SNARE: ICD-10-PCS | Mod: 33,,, | Performed by: INTERNAL MEDICINE

## 2020-10-02 PROCEDURE — 45385 COLONOSCOPY W/LESION REMOVAL: CPT | Mod: 33,,, | Performed by: INTERNAL MEDICINE

## 2020-10-02 PROCEDURE — 88305 TISSUE EXAM BY PATHOLOGIST: CPT | Performed by: PATHOLOGY

## 2020-10-02 PROCEDURE — 27201089 HC SNARE, DISP (ANY): Performed by: INTERNAL MEDICINE

## 2020-10-02 PROCEDURE — 63600175 PHARM REV CODE 636 W HCPCS: Performed by: STUDENT IN AN ORGANIZED HEALTH CARE EDUCATION/TRAINING PROGRAM

## 2020-10-02 PROCEDURE — 37000008 HC ANESTHESIA 1ST 15 MINUTES: Performed by: INTERNAL MEDICINE

## 2020-10-02 PROCEDURE — 88305 TISSUE EXAM BY PATHOLOGIST: ICD-10-PCS | Mod: 26,,, | Performed by: PATHOLOGY

## 2020-10-02 PROCEDURE — 25000003 PHARM REV CODE 250: Performed by: INTERNAL MEDICINE

## 2020-10-02 PROCEDURE — E9220 PRA ENDO ANESTHESIA: HCPCS | Mod: 33,,, | Performed by: STUDENT IN AN ORGANIZED HEALTH CARE EDUCATION/TRAINING PROGRAM

## 2020-10-02 PROCEDURE — 45385 COLONOSCOPY W/LESION REMOVAL: CPT | Performed by: INTERNAL MEDICINE

## 2020-10-02 RX ORDER — PROPOFOL 10 MG/ML
VIAL (ML) INTRAVENOUS CONTINUOUS PRN
Status: DISCONTINUED | OUTPATIENT
Start: 2020-10-02 | End: 2020-10-02

## 2020-10-02 RX ORDER — SODIUM CHLORIDE 9 MG/ML
INJECTION, SOLUTION INTRAVENOUS CONTINUOUS
Status: DISCONTINUED | OUTPATIENT
Start: 2020-10-02 | End: 2020-10-02 | Stop reason: HOSPADM

## 2020-10-02 RX ORDER — LIDOCAINE HYDROCHLORIDE 20 MG/ML
INJECTION INTRAVENOUS
Status: DISCONTINUED | OUTPATIENT
Start: 2020-10-02 | End: 2020-10-02

## 2020-10-02 RX ORDER — PROPOFOL 10 MG/ML
VIAL (ML) INTRAVENOUS
Status: DISCONTINUED | OUTPATIENT
Start: 2020-10-02 | End: 2020-10-02

## 2020-10-02 RX ADMIN — PROPOFOL 20 MG: 10 INJECTION, EMULSION INTRAVENOUS at 11:10

## 2020-10-02 RX ADMIN — LIDOCAINE HYDROCHLORIDE 100 MG: 20 INJECTION, SOLUTION INTRAVENOUS at 11:10

## 2020-10-02 RX ADMIN — PROPOFOL 50 MG: 10 INJECTION, EMULSION INTRAVENOUS at 11:10

## 2020-10-02 RX ADMIN — SODIUM CHLORIDE: 0.9 INJECTION, SOLUTION INTRAVENOUS at 10:10

## 2020-10-02 RX ADMIN — PROPOFOL 175 MCG/KG/MIN: 10 INJECTION, EMULSION INTRAVENOUS at 11:10

## 2020-10-02 NOTE — ANESTHESIA POSTPROCEDURE EVALUATION
Anesthesia Post Evaluation    Patient: Ruel Santacruz MD    Procedure(s) Performed: Procedure(s) (LRB):  COLONOSCOPY (N/A)    Final Anesthesia Type: general    Patient location during evaluation: PACU  Patient participation: Yes- Able to Participate  Level of consciousness: awake and alert and oriented  Post-procedure vital signs: reviewed and stable  Pain management: adequate  Airway patency: patent    PONV status at discharge: No PONV  Anesthetic complications: no      Cardiovascular status: blood pressure returned to baseline and hemodynamically stable  Respiratory status: unassisted, spontaneous ventilation and room air  Hydration status: euvolemic  Follow-up not needed.          Vitals Value Taken Time   /86 10/02/20 1216   Temp 36.7 °C (98 °F) 10/02/20 1152   Pulse 68 10/02/20 1216   Resp 15 10/02/20 1216   SpO2 99 % 10/02/20 1216         Event Time   Out of Recovery 12:23:47         Pain/Joan Score: No data recorded

## 2020-10-02 NOTE — DISCHARGE INSTRUCTIONS

## 2020-10-02 NOTE — ANESTHESIA PREPROCEDURE EVALUATION
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    Past Medical History:   Diagnosis Date    Sensorineural hearing loss, unspecified     Tear of lateral cartilage or meniscus of knee, current        Past Surgical History:   Procedure Laterality Date    APPENDECTOMY      BCC- nose      CYSTOSCOPY Left 7/30/2018    Procedure: CYSTOSCOPY;  Surgeon: Farida Schneider MD;  Location: [address];  Service: Urology;  Laterality: Left;    KNEE SURGERY  4/2012    left knee- arthroscopic-- torn meniscus    LASER LITHOTRIPSY Left 7/30/2018    Procedure: LITHOTRIPSY, USING LASER;  Surgeon: Farida Schneider MD;  Location: [facility] OR [address] [place] [address];  Service: Urology;  Laterality: Left;    RETROGRADE PYELOGRAPHY Bilateral 7/30/2018    Procedure: PYELOGRAM, RETROGRADE;  Surgeon: Farida Schneider MD;  Location: [address];  Service: Urology;  Laterality: Bilateral;    URETEROSCOPIC REMOVAL OF URETERIC CALCULUS Bilateral 7/30/2018    Procedure: REMOVAL, CALCULUS, URETER, URETEROSCOPIC;  Surgeon: Farida Schneider MD;  Location: [address];  Service: Urology;  Laterality: Bilateral;    URETEROSCOPY Bilateral 7/30/2018    Procedure: URETEROSCOPY;  Surgeon: Farida Schneider MD;  Location: [address];  Service: Urology;  Laterality: Bilateral;         Anesthesia Evaluation    I have reviewed the Patient Summary Reports.    I have reviewed the Nursing Notes. I have reviewed the NPO Status.   I have reviewed the Medications.     Review of Systems  Anesthesia Hx:  No problems with previous Anesthesia   Denies Personal Hx of Anesthesia complications.   Social:  Non-Smoker, No Alcohol Use    Cardiovascular:  Cardiovascular Normal Exercise tolerance: good     Renal/:   renal calculi        Physical Exam  General:  Well nourished    Airway/Jaw/Neck:  Airway Findings: Mouth Opening: < 3  cm Tongue: Normal  Mallampati: II  TM Distance: Normal, at least 6 cm  Jaw/Neck Findings:  Neck ROM: Normal ROM      Dental:  Dental Findings: In tact   Chest/Lungs:  Chest/Lungs Findings: Clear to auscultation     Heart/Vascular:  Heart Findings: Rate: Normal  Rhythm: Regular Rhythm     Abdomen:  Abdomen Findings:  Normal            Anesthesia Plan  Type of Anesthesia, risks & benefits discussed:  Anesthesia Type:  general  Patient's Preference:   Intra-op Monitoring Plan: standard ASA monitors  Intra-op Monitoring Plan Comments:   Post Op Pain Control Plan: per primary service following discharge from PACU  Post Op Pain Control Plan Comments:   Induction:   IV  Beta Blocker:  Patient is not currently on a Beta-Blocker (No further documentation required).       Informed Consent: Patient understands risks and agrees with Anesthesia plan.  Questions answered. Anesthesia consent signed with patient.  ASA Score: 2     Day of Surgery Review of History & Physical:  There are no significant changes.          Ready For Surgery From Anesthesia Perspective.

## 2020-10-02 NOTE — [employer] PATIENT INSTRUCTIONS
Discharge Summary/Instructions after an Endoscopic Procedure  Patient Name: Ruel Santacruz  Patient MRN: [id number]  Patient YOB: 1955 Friday, October 2, 2020  Art Seymour MD  RESTRICTIONS:  During your procedure today, you received medications for sedation.  These   medications may affect your judgment, balance and coordination.  Therefore,   for 24 hours, you have the following restrictions:   - DO NOT drive a car, operate machinery, make legal/financial decisions,   sign important papers or drink alcohol.    ACTIVITY:  Today: no heavy lifting, straining or running due to procedural   sedation/anesthesia.  The following day: return to full activity including work.  DIET:  Eat and drink normally unless instructed otherwise.     TREATMENT FOR COMMON SIDE EFFECTS:  - Mild abdominal pain, nausea, belching, bloating or excessive gas:  rest,   eat lightly and use a heating pad.  - Sore Throat: treat with throat lozenges and/or gargle with warm salt   water.  - Because air was used during the procedure, expelling large amounts of air   from your rectum or belching is normal.  - If a bowel prep was taken, you may not have a bowel movement for 1-3 days.    This is normal.  SYMPTOMS TO WATCH FOR AND REPORT TO YOUR PHYSICIAN:  1. Abdominal pain or bloating, other than gas cramps.  2. Chest pain.  3. Back pain.  4. Signs of infection such as: chills or fever occurring within 24 hours   after the procedure.  5. Rectal bleeding, which would show as bright red, maroon, or black stools.   (A tablespoon of blood from the rectum is not serious, especially if   hemorrhoids are present.)  6. Vomiting.  7. Weakness or dizziness.  GO DIRECTLY TO THE NEAREST EMERGENCY ROOM IF YOU HAVE ANY OF THE FOLLOWING:      Difficulty breathing              Chills and/or fever over 101 F   Persistent vomiting and/or vomiting blood   Severe abdominal pain   Severe chest pain   Black, tarry stools   Bleeding- more than one  tablespoon   Any other symptom or condition that you feel may need urgent attention  Your doctor recommends these additional instructions:  If any biopsies were taken, your doctors clinic will contact you in 1 to 2   weeks with any results.  - Discharge patient to home.   - Patient has a contact number available for emergencies.  The signs and   symptoms of potential delayed complications were discussed with the   patient.  Return to normal activities tomorrow.  Written discharge   instructions were provided to the patient.   - Resume previous diet.   - Continue present medications.   - Await pathology results.   - Repeat colonoscopy in 5 years for surveillance.   - Return to referring physician.   For questions, problems or results please call your physician - Art Seymour MD at Work:  [phone].  [facility], EMERGENCY ROOM PHONE NUMBER: [phone]  IF A COMPLICATION OR EMERGENCY SITUATION ARISES AND YOU ARE UNABLE TO REACH   YOUR PHYSICIAN - GO DIRECTLY TO THE EMERGENCY ROOM.  Art Seymour MD  10/2/2020 11:45:47 AM  This report has been verified and signed electronically.  [employer]

## 2020-10-02 NOTE — TRANSFER OF CARE
Anesthesia Transfer of Care Note    Patient: Ruel Santacruz MD    Procedure(s) Performed: Procedure(s) (LRB):  COLONOSCOPY (N/A)    Patient location: [facility]    Anesthesia Type: general    Transport from OR: Transported from OR on room air with adequate spontaneous ventilation    Post pain: adequate analgesia    Post assessment: no apparent anesthetic complications and tolerated procedure well    Post vital signs: stable    Level of consciousness: sedated and responds to stimulation    Nausea/Vomiting: no nausea/vomiting    Complications: none    Transfer of care protocol was followed      Last vitals:   Visit Vitals  /61   Pulse 75   Temp 36.7 °C (98 °F)   Resp 16   Ht 5' (1.524 m)   Wt 86.2 kg (190 lb)   SpO2 98%   BMI 37.11 kg/m²

## 2020-10-02 NOTE — H&P
Short Stay Endoscopy History and Physical    PCP - Lety Rdz MD  Referring Physician - Mikayla Townsend, FNP  [address] [facility]  [place] [id number]    Procedure - Colonoscopy  ASA - per anesthesia  Mallampati - per anesthesia  History of Anesthesia problems - no  Family history Anesthesia problems -  no   Plan of anesthesia - General    HPI  65 y.o. male  Reason for procedure: Surveillance colonoscopy; adenomatous polyps small few 2015      ROS:  Constitutional: No fevers, chills, No weight loss  CV: No chest pain  Pulm: No cough, No shortness of breath  GI: see HPI    Medical History:  has a past medical history of Sensorineural hearing loss, unspecified and Tear of lateral cartilage or meniscus of knee, current.    Surgical History:  has a past surgical history that includes Appendectomy; Knee surgery (4/2012); BCC- nose; Ureteroscopic removal of ureteric calculus (Bilateral, 7/30/2018); Cystoscopy (Left, 7/30/2018); Laser lithotripsy (Left, 7/30/2018); Retrograde pyelography (Bilateral, 7/30/2018); and Ureteroscopy (Bilateral, 7/30/2018).    Family History: family history includes Atrial fibrillation in his brother; Cancer in his brother, father, and mother; Hypertension in his brother; Thyroid nodules in his brother..    Social History:  reports that he has never smoked. He has never used smokeless tobacco. He reports that he does not drink alcohol.    Review of patient's allergies indicates:   Allergen Reactions    Contrast media      Iv dye       Medications:   Facility-Administered Medications Prior to Admission   Medication Dose Route Frequency Provider Last Rate Last Dose    gentamicin injection 160 mg  160 mg Intramuscular Once Jonathan HSweta Ojeda MD         Medications Prior to Admission   Medication Sig Dispense Refill Last Dose    allopurinoL (ZYLOPRIM) 300 MG tablet TAKE 1 TABLET BY MOUTH EVERY DAY 90 tablet 3     cephALEXin (KEFLEX) 500 MG capsule Take 1 capsule (500 mg total) by  mouth every 8 (eight) hours. (Patient not taking: Reported on 9/15/2020) 14 capsule 0     desonide (DESOWEN) 0.05 % cream Apply topically 2 (two) times daily. To be applied on the skin of the face and neck 30 mins after efudex (Patient not taking: Reported on 9/15/2020) 60 g 2     fluorouracil (EFUDEX) 5 % cream Apply topically 2 (two) times daily. Apply a thin layer to the face and scalp for 2 weeks, avoid excessive sun exposure during treatment (Patient not taking: Reported on 9/15/2020) 40 g 1     gentamicin (GARAMYCIN) 0.3 % ophthalmic solution Place 1 drop into both eyes every 4 (four) hours. (Patient not taking: Reported on 9/15/2020) 15 mL 0     HYDROcodone-acetaminophen (NORCO) 5-325 mg per tablet        ketorolac (TORADOL) 10 mg tablet        oxyCODONE-acetaminophen (PERCOCET) 5-325 mg per tablet Take 1 tablet by mouth every 6 (six) hours as needed for Pain. (Patient not taking: Reported on 9/15/2020) 10 tablet 0     potassium citrate (UROCIT-K 10) 10 mEq (1,080 mg) TbSR Take 1 tablet (10 mEq total) by mouth 3 (three) times daily with meals. 90 tablet 11     sildenafil (VIAGRA) 100 MG tablet Take 100 mg by mouth daily as needed for Erectile Dysfunction.       tadalafil (CIALIS) 20 MG Tab Take 1 tablet (20 mg total) by mouth as needed. 1 tab as needed every 36 hours (Patient not taking: Reported on 9/15/2020) 8 tablet 11     tamsulosin (FLOMAX) 0.4 mg Cap        vitamin D (VITAMIN D3) 1000 units Tab Take 1,000 Units by mouth once daily.          Physical Exam:    Vital Signs: There were no vitals filed for this visit.    General Appearance: Well appearing in no acute distress  Abdomen: Soft, non tender, non distended with normal bowel sounds, no masses    Labs:  Lab Results   Component Value Date    WBC 4.63 08/21/2020    HGB 16.4 08/21/2020    HCT 48.3 08/21/2020     (L) 08/21/2020    CHOL 185 08/21/2020    TRIG 97 08/21/2020    HDL 52 08/21/2020    ALT 24 08/21/2020    AST 20 08/21/2020      08/21/2020    K 4.8 08/21/2020     08/21/2020    CREATININE 1.2 08/21/2020    BUN 19 08/21/2020    CO2 28 08/21/2020    TSH 1.656 10/09/2018    PSA 0.53 08/21/2020    HGBA1C 5.1 01/05/2016       I have explained the risks and benefits of this endoscopic procedure to the patient including but not limited to bleeding, inflammation, infection, perforation, and death.      Art Seymour MD

## 2020-10-06 LAB
FINAL PATHOLOGIC DIAGNOSIS: NORMAL
GROSS: NORMAL
Lab: NORMAL

## 2021-01-27 ENCOUNTER — HOSPITAL ENCOUNTER (OUTPATIENT)
Dept: RADIOLOGY | Facility: HOSPITAL | Age: 66
Discharge: HOME | End: 2021-01-27
Attending: INTERNAL MEDICINE
Payer: COMMERCIAL

## 2021-01-27 DIAGNOSIS — Z13.6 ENCOUNTER FOR SCREENING FOR CARDIOVASCULAR DISORDERS: ICD-10-CM

## 2021-01-27 PROCEDURE — 75571 CT HRT W/O DYE W/CA TEST: CPT | Mod: 26,,, | Performed by: RADIOLOGY

## 2021-01-27 PROCEDURE — 75571 CT CALCIUM SCORING CARDIAC: ICD-10-PCS | Mod: 26,,, | Performed by: RADIOLOGY

## 2021-01-27 PROCEDURE — 75571 CT HRT W/O DYE W/CA TEST: CPT | Mod: TC

## 2021-05-18 ENCOUNTER — OFFICE VISIT (OUTPATIENT)
Dept: UROLOGY | Facility: CLINIC | Age: 66
End: 2021-05-18
Payer: COMMERCIAL

## 2021-05-18 ENCOUNTER — TELEPHONE (OUTPATIENT)
Dept: UROLOGY | Facility: CLINIC | Age: 66
End: 2021-05-18

## 2021-05-18 DIAGNOSIS — N20.0 NEPHROLITHIASIS: Primary | ICD-10-CM

## 2021-05-18 DIAGNOSIS — N35.812 OTHER STRICTURE OF BULBOUS URETHRA IN MALE: ICD-10-CM

## 2021-05-18 PROCEDURE — 1101F PT FALLS ASSESS-DOCD LE1/YR: CPT | Mod: CPTII,S$GLB,, | Performed by: UROLOGY

## 2021-05-18 PROCEDURE — 1159F PR MEDICATION LIST DOCUMENTED IN MEDICAL RECORD: ICD-10-PCS | Mod: S$GLB,,, | Performed by: UROLOGY

## 2021-05-18 PROCEDURE — 1159F MED LIST DOCD IN RCRD: CPT | Mod: S$GLB,,, | Performed by: UROLOGY

## 2021-05-18 PROCEDURE — 3288F FALL RISK ASSESSMENT DOCD: CPT | Mod: CPTII,S$GLB,, | Performed by: UROLOGY

## 2021-05-18 PROCEDURE — 1101F PR PT FALLS ASSESS DOC 0-1 FALLS W/OUT INJ PAST YR: ICD-10-PCS | Mod: CPTII,S$GLB,, | Performed by: UROLOGY

## 2021-05-18 PROCEDURE — 99999 PR PBB SHADOW E&M-EST. PATIENT-LVL III: ICD-10-PCS | Mod: PBBFAC,,, | Performed by: UROLOGY

## 2021-05-18 PROCEDURE — 99214 OFFICE O/P EST MOD 30 MIN: CPT | Mod: S$GLB,,, | Performed by: UROLOGY

## 2021-05-18 PROCEDURE — 99999 PR PBB SHADOW E&M-EST. PATIENT-LVL III: CPT | Mod: PBBFAC,,, | Performed by: UROLOGY

## 2021-05-18 PROCEDURE — 99214 PR OFFICE/OUTPT VISIT, EST, LEVL IV, 30-39 MIN: ICD-10-PCS | Mod: S$GLB,,, | Performed by: UROLOGY

## 2021-05-18 PROCEDURE — 3288F PR FALLS RISK ASSESSMENT DOCUMENTED: ICD-10-PCS | Mod: CPTII,S$GLB,, | Performed by: UROLOGY

## 2022-02-16 PROBLEM — I10 PRIMARY HYPERTENSION: Status: ACTIVE | Noted: 2022-02-16

## 2022-02-21 ENCOUNTER — HOSPITAL ENCOUNTER (OUTPATIENT)
Facility: HOSPITAL | Age: 67
Discharge: HOME | End: 2022-02-21
Attending: EMERGENCY MEDICINE | Admitting: INTERNAL MEDICINE
Payer: MEDICARE

## 2022-02-21 ENCOUNTER — TELEPHONE (OUTPATIENT)
Dept: ELECTROPHYSIOLOGY | Facility: CLINIC | Age: 67
End: 2022-02-21
Payer: MEDICARE

## 2022-02-21 VITALS
WEIGHT: 195 LBS | TEMPERATURE: 98 F | DIASTOLIC BLOOD PRESSURE: 79 MMHG | OXYGEN SATURATION: 95 % | BODY MASS INDEX: 28.88 KG/M2 | SYSTOLIC BLOOD PRESSURE: 146 MMHG | HEART RATE: 60 BPM | RESPIRATION RATE: 18 BRPM | HEIGHT: 69 IN

## 2022-02-21 DIAGNOSIS — R07.9 CHEST PAIN: ICD-10-CM

## 2022-02-21 DIAGNOSIS — I44.2 AIVR (ACCELERATED IDIOVENTRICULAR RHYTHM): ICD-10-CM

## 2022-02-21 DIAGNOSIS — I49.3 PVC'S (PREMATURE VENTRICULAR CONTRACTIONS): Primary | ICD-10-CM

## 2022-02-21 DIAGNOSIS — R07.89 CHEST HEAVINESS: ICD-10-CM

## 2022-02-21 DIAGNOSIS — I49.8 OTHER SPECIFIED CARDIAC ARRHYTHMIAS: Primary | ICD-10-CM

## 2022-02-21 DIAGNOSIS — R07.89 CHEST PRESSURE: ICD-10-CM

## 2022-02-21 PROBLEM — R00.2 PALPITATIONS: Status: ACTIVE | Noted: 2022-02-21

## 2022-02-21 PROBLEM — E78.5 HYPERLIPIDEMIA: Status: ACTIVE | Noted: 2022-02-21

## 2022-02-21 LAB
ALBUMIN SERPL BCP-MCNC: 4 G/DL (ref 3.5–5.2)
ALP SERPL-CCNC: 60 U/L (ref 55–135)
ALT SERPL W/O P-5'-P-CCNC: 24 U/L (ref 10–44)
ANION GAP SERPL CALC-SCNC: 9 MMOL/L (ref 8–16)
ASCENDING AORTA: 3.44 CM
AST SERPL-CCNC: 19 U/L (ref 10–40)
BASOPHILS # BLD AUTO: 0.05 K/UL (ref 0–0.2)
BASOPHILS NFR BLD: 1 % (ref 0–1.9)
BILIRUB SERPL-MCNC: 0.6 MG/DL (ref 0.1–1)
BILIRUB UR QL STRIP: NEGATIVE
BNP SERPL-MCNC: 19 PG/ML (ref 0–99)
BSA FOR ECHO PROCEDURE: 2.07 M2
BUN SERPL-MCNC: 15 MG/DL (ref 8–23)
CALCIUM SERPL-MCNC: 9.4 MG/DL (ref 8.7–10.5)
CHLORIDE SERPL-SCNC: 109 MMOL/L (ref 95–110)
CLARITY UR REFRACT.AUTO: CLEAR
CO2 SERPL-SCNC: 23 MMOL/L (ref 23–29)
COLOR UR AUTO: YELLOW
CREAT SERPL-MCNC: 0.9 MG/DL (ref 0.5–1.4)
CTP QC/QA: YES
CV ECHO LV RWT: 0.34 CM
CV STRESS BASE HR: 63 BPM
D DIMER PPP IA.FEU-MCNC: 0.28 MG/L FEU
DIASTOLIC BLOOD PRESSURE: 86 MMHG
DIFFERENTIAL METHOD: ABNORMAL
DOP CALC LVOT AREA: 4.3 CM2
DOP CALC LVOT DIAMETER: 2.34 CM
DOP CALC LVOT PEAK VEL: 1.1 M/S
DOP CALC LVOT STROKE VOLUME: 113.86 CM3
DOP CALCLVOT PEAK VEL VTI: 26.49 CM
E WAVE DECELERATION TIME: 172.7 MSEC
E/A RATIO: 0.87
E/E' RATIO: 8.4 M/S
ECHO LV POSTERIOR WALL: 0.87 CM (ref 0.6–1.1)
EJECTION FRACTION: 58 %
EOSINOPHIL # BLD AUTO: 0.2 K/UL (ref 0–0.5)
EOSINOPHIL NFR BLD: 3.1 % (ref 0–8)
ERYTHROCYTE [DISTWIDTH] IN BLOOD BY AUTOMATED COUNT: 13.1 % (ref 11.5–14.5)
EST. GFR  (AFRICAN AMERICAN): >60 ML/MIN/1.73 M^2
EST. GFR  (NON AFRICAN AMERICAN): >60 ML/MIN/1.73 M^2
FRACTIONAL SHORTENING: 37 % (ref 28–44)
GLUCOSE SERPL-MCNC: 99 MG/DL (ref 70–110)
GLUCOSE UR QL STRIP: NEGATIVE
HCT VFR BLD AUTO: 46.8 % (ref 40–54)
HGB BLD-MCNC: 15.7 G/DL (ref 14–18)
HGB UR QL STRIP: NEGATIVE
IMM GRANULOCYTES # BLD AUTO: 0.01 K/UL (ref 0–0.04)
IMM GRANULOCYTES NFR BLD AUTO: 0.2 % (ref 0–0.5)
INTERVENTRICULAR SEPTUM: 0.71 CM (ref 0.6–1.1)
KETONES UR QL STRIP: NEGATIVE
LA MAJOR: 5.5 CM
LA MINOR: 5.7 CM
LA WIDTH: 4 CM
LEFT ATRIUM SIZE: 3.49 CM
LEFT ATRIUM VOLUME INDEX: 32.6 ML/M2
LEFT ATRIUM VOLUME: 66.43 CM3
LEFT INTERNAL DIMENSION IN SYSTOLE: 3.21 CM (ref 2.1–4)
LEFT VENTRICLE DIASTOLIC VOLUME INDEX: 61.03 ML/M2
LEFT VENTRICLE DIASTOLIC VOLUME: 124.5 ML
LEFT VENTRICLE MASS INDEX: 68 G/M2
LEFT VENTRICLE SYSTOLIC VOLUME INDEX: 20.2 ML/M2
LEFT VENTRICLE SYSTOLIC VOLUME: 41.3 ML
LEFT VENTRICULAR INTERNAL DIMENSION IN DIASTOLE: 5.11 CM (ref 3.5–6)
LEFT VENTRICULAR MASS: 138.7 G
LEUKOCYTE ESTERASE UR QL STRIP: NEGATIVE
LIPASE SERPL-CCNC: 21 U/L (ref 4–60)
LV LATERAL E/E' RATIO: 7.64 M/S
LV SEPTAL E/E' RATIO: 9.33 M/S
LYMPHOCYTES # BLD AUTO: 1.1 K/UL (ref 1–4.8)
LYMPHOCYTES NFR BLD: 21.6 % (ref 18–48)
MAGNESIUM SERPL-MCNC: 1.9 MG/DL (ref 1.6–2.6)
MCH RBC QN AUTO: 31.4 PG (ref 27–31)
MCHC RBC AUTO-ENTMCNC: 33.5 G/DL (ref 32–36)
MCV RBC AUTO: 94 FL (ref 82–98)
MONOCYTES # BLD AUTO: 0.4 K/UL (ref 0.3–1)
MONOCYTES NFR BLD: 8.8 % (ref 4–15)
MV A" WAVE DURATION": 15.98 MSEC
MV PEAK A VEL: 0.97 M/S
MV PEAK E VEL: 0.84 M/S
MV STENOSIS PRESSURE HALF TIME: 50.08 MS
MV VALVE AREA P 1/2 METHOD: 4.39 CM2
NEUTROPHILS # BLD AUTO: 3.2 K/UL (ref 1.8–7.7)
NEUTROPHILS NFR BLD: 65.3 % (ref 38–73)
NITRITE UR QL STRIP: NEGATIVE
NRBC BLD-RTO: 0 /100 WBC
OHS CV CPX 1 MINUTE RECOVERY HEART RATE: 85 BPM
OHS CV CPX 85 PERCENT MAX PREDICTED HEART RATE MALE: 130
OHS CV CPX ESTIMATED METS: 13
OHS CV CPX MAX PREDICTED HEART RATE: 153
OHS CV CPX PATIENT IS FEMALE: 0
OHS CV CPX PATIENT IS MALE: 1
OHS CV CPX PEAK DIASTOLIC BLOOD PRESSURE: 70 MMHG
OHS CV CPX PEAK HEAR RATE: 134 BPM
OHS CV CPX PEAK RATE PRESSURE PRODUCT: NORMAL
OHS CV CPX PEAK SYSTOLIC BLOOD PRESSURE: 186 MMHG
OHS CV CPX PERCENT MAX PREDICTED HEART RATE ACHIEVED: 88
OHS CV CPX RATE PRESSURE PRODUCT PRESENTING: 9891
PH UR STRIP: 7 [PH] (ref 5–8)
PISA TR MAX VEL: 2.46 M/S
PLATELET # BLD AUTO: 136 K/UL (ref 150–450)
PMV BLD AUTO: 10 FL (ref 9.2–12.9)
POTASSIUM SERPL-SCNC: 4 MMOL/L (ref 3.5–5.1)
PROT SERPL-MCNC: 6.8 G/DL (ref 6–8.4)
PROT UR QL STRIP: NEGATIVE
PULM VEIN S/D RATIO: 1.37
PV PEAK D VEL: 0.41 M/S
PV PEAK S VEL: 0.56 M/S
RA MAJOR: 5.6 CM
RA PRESSURE: 3 MMHG
RA WIDTH: 3.1 CM
RBC # BLD AUTO: 5 M/UL (ref 4.6–6.2)
RV TISSUE DOPPLER FREE WALL SYSTOLIC VELOCITY 1 (APICAL 4 CHAMBER VIEW): 11.17 CM/S
SARS-COV-2 RDRP RESP QL NAA+PROBE: NEGATIVE
SINUS: 2.86 CM
SODIUM SERPL-SCNC: 141 MMOL/L (ref 136–145)
SP GR UR STRIP: 1.01 (ref 1–1.03)
STJ: 2.94 CM
STRESS ECHO POST EXERCISE DUR MIN: 7 MINUTES
STRESS ECHO POST EXERCISE DUR SEC: 55 SECONDS
SYSTOLIC BLOOD PRESSURE: 157 MMHG
TDI LATERAL: 0.11 M/S
TDI SEPTAL: 0.09 M/S
TDI: 0.1 M/S
TR MAX PG: 24 MMHG
TRICUSPID ANNULAR PLANE SYSTOLIC EXCURSION: 2.45 CM
TROPONIN I SERPL DL<=0.01 NG/ML-MCNC: 0.01 NG/ML (ref 0–0.03)
TROPONIN I SERPL DL<=0.01 NG/ML-MCNC: <0.006 NG/ML (ref 0–0.03)
TV REST PULMONARY ARTERY PRESSURE: 27 MMHG
URN SPEC COLLECT METH UR: NORMAL
WBC # BLD AUTO: 4.86 K/UL (ref 3.9–12.7)

## 2022-02-21 PROCEDURE — 84484 ASSAY OF TROPONIN QUANT: CPT | Mod: HCNC | Performed by: EMERGENCY MEDICINE

## 2022-02-21 PROCEDURE — A4216 STERILE WATER/SALINE, 10 ML: HCPCS | Mod: HCNC | Performed by: PHYSICIAN ASSISTANT

## 2022-02-21 PROCEDURE — 99285 PR EMERGENCY DEPT VISIT,LEVEL V: ICD-10-PCS | Mod: CS,,, | Performed by: EMERGENCY MEDICINE

## 2022-02-21 PROCEDURE — 99214 PR OFFICE/OUTPT VISIT, EST, LEVL IV, 30-39 MIN: ICD-10-PCS | Mod: HCNC,GC,, | Performed by: INTERNAL MEDICINE

## 2022-02-21 PROCEDURE — 93005 ELECTROCARDIOGRAM TRACING: CPT | Mod: HCNC

## 2022-02-21 PROCEDURE — 93010 EKG 12-LEAD: ICD-10-PCS | Mod: HCNC,76,, | Performed by: INTERNAL MEDICINE

## 2022-02-21 PROCEDURE — 93010 ELECTROCARDIOGRAM REPORT: CPT | Mod: HCNC,,, | Performed by: INTERNAL MEDICINE

## 2022-02-21 PROCEDURE — 81003 URINALYSIS AUTO W/O SCOPE: CPT | Mod: HCNC | Performed by: PHYSICIAN ASSISTANT

## 2022-02-21 PROCEDURE — 84484 ASSAY OF TROPONIN QUANT: CPT | Mod: 91,HCNC | Performed by: PHYSICIAN ASSISTANT

## 2022-02-21 PROCEDURE — 83735 ASSAY OF MAGNESIUM: CPT | Mod: HCNC | Performed by: EMERGENCY MEDICINE

## 2022-02-21 PROCEDURE — 25000003 PHARM REV CODE 250: Mod: HCNC | Performed by: EMERGENCY MEDICINE

## 2022-02-21 PROCEDURE — 86803 HEPATITIS C AB TEST: CPT | Mod: HCNC | Performed by: EMERGENCY MEDICINE

## 2022-02-21 PROCEDURE — U0002 COVID-19 LAB TEST NON-CDC: HCPCS | Mod: HCNC | Performed by: EMERGENCY MEDICINE

## 2022-02-21 PROCEDURE — 99220 PR INITIAL OBSERVATION CARE,LEVL III: ICD-10-PCS | Mod: HCNC,,, | Performed by: PHYSICIAN ASSISTANT

## 2022-02-21 PROCEDURE — 83880 ASSAY OF NATRIURETIC PEPTIDE: CPT | Mod: HCNC | Performed by: EMERGENCY MEDICINE

## 2022-02-21 PROCEDURE — G0378 HOSPITAL OBSERVATION PER HR: HCPCS | Mod: HCNC

## 2022-02-21 PROCEDURE — 85379 FIBRIN DEGRADATION QUANT: CPT | Mod: HCNC | Performed by: EMERGENCY MEDICINE

## 2022-02-21 PROCEDURE — 93010 ELECTROCARDIOGRAM REPORT: CPT | Mod: HCNC,76,, | Performed by: INTERNAL MEDICINE

## 2022-02-21 PROCEDURE — 99499 NO LOS: ICD-10-PCS | Mod: HCNC,,, | Performed by: PHYSICIAN ASSISTANT

## 2022-02-21 PROCEDURE — 99285 EMERGENCY DEPT VISIT HI MDM: CPT | Mod: CS,,, | Performed by: EMERGENCY MEDICINE

## 2022-02-21 PROCEDURE — 99499 UNLISTED E&M SERVICE: CPT | Mod: HCNC,,, | Performed by: PHYSICIAN ASSISTANT

## 2022-02-21 PROCEDURE — 85025 COMPLETE CBC W/AUTO DIFF WBC: CPT | Mod: HCNC | Performed by: EMERGENCY MEDICINE

## 2022-02-21 PROCEDURE — 99285 EMERGENCY DEPT VISIT HI MDM: CPT | Mod: 25,HCNC

## 2022-02-21 PROCEDURE — 99220 PR INITIAL OBSERVATION CARE,LEVL III: CPT | Mod: HCNC,,, | Performed by: PHYSICIAN ASSISTANT

## 2022-02-21 PROCEDURE — 80053 COMPREHEN METABOLIC PANEL: CPT | Mod: HCNC | Performed by: EMERGENCY MEDICINE

## 2022-02-21 PROCEDURE — 25000003 PHARM REV CODE 250: Mod: HCNC | Performed by: PHYSICIAN ASSISTANT

## 2022-02-21 PROCEDURE — 99214 OFFICE O/P EST MOD 30 MIN: CPT | Mod: HCNC,GC,, | Performed by: INTERNAL MEDICINE

## 2022-02-21 PROCEDURE — 83690 ASSAY OF LIPASE: CPT | Mod: HCNC | Performed by: EMERGENCY MEDICINE

## 2022-02-21 RX ORDER — VERAPAMIL HYDROCHLORIDE 180 MG/1
180 TABLET, FILM COATED, EXTENDED RELEASE ORAL NIGHTLY
Status: DISCONTINUED | OUTPATIENT
Start: 2022-02-21 | End: 2022-02-21 | Stop reason: HOSPADM

## 2022-02-21 RX ORDER — CHOLECALCIFEROL (VITAMIN D3) 25 MCG
1000 TABLET ORAL DAILY
Status: DISCONTINUED | OUTPATIENT
Start: 2022-02-21 | End: 2022-02-21 | Stop reason: HOSPADM

## 2022-02-21 RX ORDER — SIMETHICONE 80 MG
1 TABLET,CHEWABLE ORAL 4 TIMES DAILY PRN
Status: DISCONTINUED | OUTPATIENT
Start: 2022-02-21 | End: 2022-02-21 | Stop reason: HOSPADM

## 2022-02-21 RX ORDER — LISINOPRIL 10 MG/1
10 TABLET ORAL DAILY
Status: DISCONTINUED | OUTPATIENT
Start: 2022-02-21 | End: 2022-02-21 | Stop reason: HOSPADM

## 2022-02-21 RX ORDER — SODIUM CHLORIDE 0.9 % (FLUSH) 0.9 %
10 SYRINGE (ML) INJECTION EVERY 8 HOURS
Status: DISCONTINUED | OUTPATIENT
Start: 2022-02-21 | End: 2022-02-21 | Stop reason: HOSPADM

## 2022-02-21 RX ORDER — ACETAMINOPHEN 325 MG/1
650 TABLET ORAL EVERY 4 HOURS PRN
Status: DISCONTINUED | OUTPATIENT
Start: 2022-02-21 | End: 2022-02-21 | Stop reason: HOSPADM

## 2022-02-21 RX ORDER — ATORVASTATIN CALCIUM 40 MG/1
80 TABLET, FILM COATED ORAL DAILY
Status: DISCONTINUED | OUTPATIENT
Start: 2022-02-21 | End: 2022-02-21 | Stop reason: HOSPADM

## 2022-02-21 RX ORDER — MAG HYDROX/ALUMINUM HYD/SIMETH 200-200-20
30 SUSPENSION, ORAL (FINAL DOSE FORM) ORAL 4 TIMES DAILY PRN
Status: DISCONTINUED | OUTPATIENT
Start: 2022-02-21 | End: 2022-02-21 | Stop reason: HOSPADM

## 2022-02-21 RX ORDER — VERAPAMIL HYDROCHLORIDE 180 MG/1
180 TABLET, FILM COATED, EXTENDED RELEASE ORAL NIGHTLY
Qty: 30 TABLET | Refills: 11 | Status: SHIPPED | OUTPATIENT
Start: 2022-02-21 | End: 2022-03-11 | Stop reason: SDUPTHER

## 2022-02-21 RX ORDER — POLYETHYLENE GLYCOL 3350 17 G/17G
17 POWDER, FOR SOLUTION ORAL DAILY
Status: DISCONTINUED | OUTPATIENT
Start: 2022-02-21 | End: 2022-02-21 | Stop reason: HOSPADM

## 2022-02-21 RX ORDER — ALLOPURINOL 300 MG/1
300 TABLET ORAL DAILY
Status: DISCONTINUED | OUTPATIENT
Start: 2022-02-21 | End: 2022-02-21 | Stop reason: HOSPADM

## 2022-02-21 RX ORDER — GLUCAGON 1 MG
1 KIT INJECTION
Status: DISCONTINUED | OUTPATIENT
Start: 2022-02-21 | End: 2022-02-21 | Stop reason: HOSPADM

## 2022-02-21 RX ORDER — NAPROXEN SODIUM 220 MG/1
324 TABLET, FILM COATED ORAL
Status: COMPLETED | OUTPATIENT
Start: 2022-02-21 | End: 2022-02-21

## 2022-02-21 RX ORDER — CARVEDILOL 12.5 MG/1
12.5 TABLET ORAL 2 TIMES DAILY
Status: DISCONTINUED | OUTPATIENT
Start: 2022-02-21 | End: 2022-02-21

## 2022-02-21 RX ORDER — IPRATROPIUM BROMIDE AND ALBUTEROL SULFATE 2.5; .5 MG/3ML; MG/3ML
3 SOLUTION RESPIRATORY (INHALATION) EVERY 6 HOURS PRN
Status: DISCONTINUED | OUTPATIENT
Start: 2022-02-21 | End: 2022-02-21 | Stop reason: HOSPADM

## 2022-02-21 RX ORDER — ONDANSETRON 8 MG/1
8 TABLET, ORALLY DISINTEGRATING ORAL EVERY 8 HOURS PRN
Status: DISCONTINUED | OUTPATIENT
Start: 2022-02-21 | End: 2022-02-21 | Stop reason: HOSPADM

## 2022-02-21 RX ORDER — NALOXONE HCL 0.4 MG/ML
0.02 VIAL (ML) INJECTION
Status: DISCONTINUED | OUTPATIENT
Start: 2022-02-21 | End: 2022-02-21 | Stop reason: HOSPADM

## 2022-02-21 RX ORDER — IBUPROFEN 200 MG
24 TABLET ORAL
Status: DISCONTINUED | OUTPATIENT
Start: 2022-02-21 | End: 2022-02-21 | Stop reason: HOSPADM

## 2022-02-21 RX ORDER — TALC
6 POWDER (GRAM) TOPICAL NIGHTLY PRN
Status: DISCONTINUED | OUTPATIENT
Start: 2022-02-21 | End: 2022-02-21 | Stop reason: HOSPADM

## 2022-02-21 RX ORDER — PROCHLORPERAZINE EDISYLATE 5 MG/ML
5 INJECTION INTRAMUSCULAR; INTRAVENOUS EVERY 6 HOURS PRN
Status: DISCONTINUED | OUTPATIENT
Start: 2022-02-21 | End: 2022-02-21 | Stop reason: HOSPADM

## 2022-02-21 RX ORDER — NITROGLYCERIN 0.4 MG/1
0.4 TABLET SUBLINGUAL
Status: DISCONTINUED | OUTPATIENT
Start: 2022-02-21 | End: 2022-02-21

## 2022-02-21 RX ORDER — IBUPROFEN 200 MG
16 TABLET ORAL
Status: DISCONTINUED | OUTPATIENT
Start: 2022-02-21 | End: 2022-02-21 | Stop reason: HOSPADM

## 2022-02-21 RX ADMIN — ALLOPURINOL 300 MG: 300 TABLET ORAL at 03:02

## 2022-02-21 RX ADMIN — ATORVASTATIN CALCIUM 80 MG: 40 TABLET, FILM COATED ORAL at 03:02

## 2022-02-21 RX ADMIN — LISINOPRIL 10 MG: 10 TABLET ORAL at 03:02

## 2022-02-21 RX ADMIN — SODIUM CHLORIDE 10 ML: 9 INJECTION, SOLUTION INTRAMUSCULAR; INTRAVENOUS; SUBCUTANEOUS at 02:02

## 2022-02-21 RX ADMIN — ASPIRIN 81 MG CHEWABLE TABLET 324 MG: 81 TABLET CHEWABLE at 05:02

## 2022-02-21 RX ADMIN — Medication 1000 UNITS: at 03:02

## 2022-02-21 NOTE — ED NOTES
Assumed care of patient. Pt remains on cardiac monitor, continuous pulse ox, and cycling blood pressures. Bed placed in low locked position, side rails up x2, call bell is within reach. VSS.  Denies any pain at this time.  Wife at bedside.  Call button in reach.

## 2022-02-21 NOTE — HPI
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 He reports he's had brief episodes of palpitations for a while, so he was started on diltiazem. He later noticed that his BP was not well controlled on Dilt, so he switched to Coreg. 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 He presented to [facility] ER and had negative troponin tests and an echo which was unremarkable. He was discharged with a 48 hour holter, which only showed 26 pvcs. Pt stated that he self-discontinued off his coreg for 1.5 days since he read that BB can provoke pvcs. He took a half dose on Sunday night and at 3a, he woke up to use bathroom and noticed persistent palpitations. He took a full dose or coreg (12.5 mg ) in the morning. He presented to [facility] for further evaluation.    Of note, patient is very active and runs routinely including a recent 15 kilometer run a few weeks ago. He states in the 20 years he's been running, he had one episode of palpitations.     In the ER, he was hemodynamically stable. ECGs were done. 2 ecgs captured him to be in a wide complex aivr with HR  and av dissociation. He was later found to be in nsr with HR 60. Of note, general cardiology fellow noticed him to be symptomatic at the time of his PVCs. He was admitted for further evaluation.     2/16/22    The left ventricle (LV) is normal in size with normal systolic function. Normal LV geometry.  There are no significant regional wall motion abnormalities.  LV ejection fraction is 55-60%. Normal LV diastolic function.  The right ventricle (RV) is upper limit normal in size with normal systolic function. RV strain is -22%.  Estimated RVSP is normal (less than 40 mmHg). Pulmonary hypertension is absent.  Normal sized atria.  Mild mitral and tricuspid regurgitation.  When compared to the most recent prior study  available, no clinically significant changes.

## 2022-02-21 NOTE — CONSULTS
[facility] Dept  Cardiology  Consult Note    Patient Name: Rule ALEXIS MD Neeta  MRN: [id number]  Admission Date: 2/21/2022  Hospital Length of Stay: 0 days  Code Status: No Order   Attending Provider: Quentin Rodriguez MD   Consulting Provider: Jassi Gannon MD  Primary Care Physician: Lety Rdz MD  Principal Problem:<principal problem not specified>    Patient information was obtained from patient, past medical records and ER records.     Inpatient consult to Cardiology  Consult performed by: Jassi Gannon MD  Consult ordered by: Marion Gallardo MD        Subjective:     Chief Complaint:  Chest pressure     HPI:   67 y/o M with hypertension. Presents to ED complaining of palpitations and chest pressure. 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. On Wednesday he was in clinic and started having palpitations associated with chest pressure. He went to the ED at [facility], cardiac enzymes and echo were normal and was discharged home. He wore a Holter for 48rs that showed <0.01% of PVCs and no arrhythmias. This morning, he woke up at 330 am and went to the bathroom. He then started feeling palpitations and chest pressure again, intermittent, not associated with exertion. No prior history of cardiac disease. He is active. Walked/ran 4 miles yesterday and 9 miles one week ago.       Past Medical History:   Diagnosis Date    Primary hypertension 2/16/2022    Sensorineural hearing loss, unspecified     Tear of lateral cartilage or meniscus of knee, current        Past Surgical History:   Procedure Laterality Date    APPENDECTOMY      BCC- nose      COLONOSCOPY N/A 10/2/2020    Procedure: COLONOSCOPY;  Surgeon: Art Seymour MD;  Location: [address];  Service: Endoscopy;  Laterality: N/A;  outside referral received, see -BB  covid 9/29/2059-[id number]  pt requested a Friday appt-BB    CYSTOSCOPY Left 7/30/2018    Procedure: CYSTOSCOPY;  Surgeon: Farida Schneider MD;   [facility];  Service: Urology;  Laterality: Left;    KNEE SURGERY  4/2012    left knee- arthroscopic-- torn meniscus    LASER LITHOTRIPSY Left 7/30/2018    Procedure: LITHOTRIPSY, USING LASER;  Surgeon: Farida Schneider MD;  [facility];  Service: Urology;  Laterality: Left;    RETROGRADE PYELOGRAPHY Bilateral 7/30/2018    Procedure: PYELOGRAM, RETROGRADE;  Surgeon: Farida Schneider MD;  [facility];  Service: Urology;  Laterality: Bilateral;    URETEROSCOPIC REMOVAL OF URETERIC CALCULUS Bilateral 7/30/2018    Procedure: REMOVAL, CALCULUS, URETER, URETEROSCOPIC;  Surgeon: Farida Schneider MD;  [facility];  Service: Urology;  Laterality: Bilateral;    URETEROSCOPY Bilateral 7/30/2018    Procedure: URETEROSCOPY;  Surgeon: Farida Schneider MD;  Location: [facility] OR [address] [place] [address];  Service: Urology;  Laterality: Bilateral;       Review of patient's allergies indicates:   Allergen Reactions    Contrast media      Iv dye       Current Facility-Administered Medications on File Prior to Encounter   Medication    gentamicin injection 160 mg     Current Outpatient Medications on File Prior to Encounter   Medication Sig    allopurinoL (ZYLOPRIM) 300 MG tablet Take 1 tablet (300 mg total) by mouth once daily.    aspirin (ECOTRIN) 81 MG EC tablet Take 1 tablet (81 mg total) by mouth once daily.    carvediloL (COREG) 12.5 MG tablet Take 1 tablet (12.5 mg total) by mouth 2 (two) times daily.    desonide (DESOWEN) 0.05 % cream Apply topically 2 (two) times daily. To be applied on the skin of the face and neck 30 mins after efudex    diltiaZEM (DILACOR XR) 120 MG CDCR Take 1 capsule (120 mg total) by mouth once daily.    fluorouracil (EFUDEX) 5 % cream Apply topically 2 (two) times daily. Apply a thin layer to the face and scalp for 2 weeks, avoid excessive sun exposure during treatment (Patient not taking: Reported on 6/8/2021)     hydroCHLOROthiazide (HYDRODIURIL) 12.5 MG Tab Take 1 tablet (12.5 mg total) by mouth once daily.    lisinopriL 10 MG tablet Take 1 tablet (10 mg total) by mouth 2 (two) times daily. (Patient taking differently: Take 10 mg by mouth once daily.)    potassium citrate (UROCIT-K 10) 10 mEq (1,080 mg) TbSR Take 1 tablet (10 mEq total) by mouth 3 (three) times daily with meals.    rosuvastatin (CRESTOR) 20 MG tablet TAKE 1 TABLET BY MOUTH EVERY DAY IN THE EVENING    sildenafil (VIAGRA) 100 MG tablet Take 100 mg by mouth daily as needed for Erectile Dysfunction.    tadalafil (CIALIS) 20 MG Tab Take 1 tablet (20 mg total) by mouth as needed. 1 tab as needed every 36 hours    tamsulosin (FLOMAX) 0.4 mg Cap     vitamin D (VITAMIN D3) 1000 units Tab Take 1,000 Units by mouth once daily.     Family History       Problem Relation (Age of Onset)    Atrial fibrillation Brother    Cancer Mother, Father, Brother    Hypertension Brother    Thyroid nodules Brother          Tobacco Use    Smoking status: Never Smoker    Smokeless tobacco: Never Used   Substance and Sexual Activity    Alcohol use: No    Drug use: Not on file    Sexual activity: Not on file     Review of Systems   Constitutional: Negative for chills and fever.   Cardiovascular:  Positive for chest pain and palpitations. Negative for dyspnea on exertion and syncope.   Respiratory:  Negative for cough and sleep disturbances due to breathing.    Musculoskeletal:  Negative for back pain.   Gastrointestinal:  Negative for abdominal pain, nausea and vomiting.   Neurological:  Negative for dizziness and focal weakness.   Psychiatric/Behavioral:  Negative for altered mental status.    Objective:     Vital Signs (Most Recent):  Temp: 98.5 °F (36.9 °C) (02/21/22 0535)  Pulse: 95 (02/21/22 0602)  Resp: 18 (02/21/22 0531)  BP: (!) 177/106 (02/21/22 0602)  SpO2: 100 % (02/21/22 0602)   Vital Signs (24h Range):  Temp:  [98.5 °F (36.9 °C)] 98.5 °F (36.9 °C)  Pulse:  [63-95]  95  Resp:  [18] 18  SpO2:  [99 %-100 %] 100 %  BP: (176-185)/() 177/106     Weight: 88.5 kg (195 lb)  Body mass index is 28.8 kg/m².    SpO2: 100 %  O2 Device (Oxygen Therapy): room air    No intake or output data in the 24 hours ending 02/21/22 0648    Lines/Drains/Airways       Peripheral Intravenous Line  Duration                  Peripheral IV - Single Lumen 02/21/22 0554 20 G Left Antecubital <1 day                    Physical Exam  Constitutional:       General: He is not in acute distress.     Appearance: He is well-developed. He is not diaphoretic.   HENT:      Head: Normocephalic and atraumatic.   Eyes:      Conjunctiva/sclera: Conjunctivae normal.   Neck:      Trachea: No tracheal deviation.   Cardiovascular:      Rate and Rhythm: Normal rate and regular rhythm.      Heart sounds: Normal heart sounds. No murmur heard.  Pulmonary:      Effort: Pulmonary effort is normal. No respiratory distress.      Breath sounds: Normal breath sounds. No wheezing.   Abdominal:      General: Bowel sounds are normal. There is no distension.      Palpations: Abdomen is soft.      Tenderness: There is no abdominal tenderness.   Musculoskeletal:         General: Normal range of motion.      Cervical back: Normal range of motion.   Neurological:      Mental Status: He is alert and oriented to person, place, and time.       Significant Labs: All pertinent lab results from the last 24 hours have been reviewed.    Significant Imaging: All pertinent images from the last 24h have been reviewed.    Assessment and Plan:     Chest pressure  Patient presents with palpitations and chest pressure. Not associated with exertion although he does have mild left sided pain when he begins a run and then goes away. Initial ECG with sinus tachycardia, 1st degree AV block and IVCD.When his HR decreases to 60s his QRS normalizes. His symptoms are consistent with PVCs or runs of PVCs.   -First troponin is negative and has another negative  troponin on 2/16. Will check one more.  -  TTE on 2/16 at [facility] showed normal LVEF with no WMA. RV size upper limit of normal.   - He is at intermediate pretest probability for CAD, will evaluate with stress echo today. Will also be able to evaluate PVC burden while exercising.             VTE Risk Mitigation (From admission, onward)    None          Thank you for your consult. I will follow-up with patient. Please contact us if you have any additional questions.    Jassi Gannon MD  Cardiology   [facility]

## 2022-02-21 NOTE — ASSESSMENT & PLAN NOTE
Ecg consistent with AIVR and av dissociation. Unclear etiology at this time  Symptoms of heaviness likely due to AV dyssynchrony  Agree with ischemic workup  If stress test is negative, we would recommend discontinuation of coreg and starting on verapamil 180 mg XR daily  Will setup with Dr. Coronado in clinic in 2 months

## 2022-02-21 NOTE — HPI
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 Pt states he woke up at 330 am and went to the bathroom. He then started feeling palpitations and chest pressure again, intermittent, not associated with exertion. Of note, On Wednesday he was in clinic and started having palpitations associated with chest pressure. He went to the ED at [facility], cardiac enzymes and echo were normal and was discharged home. He wore a Holter for 48rs to be interpreted by his cardiologist. Pt states other than HTN he has no cardiac history. No prior history of cardiac disease. He is active. Walked/ran 4 miles yesterday and 9 miles one week. Pt states that nothing really triggers the symptoms and he denies any dizziness or associated weakness. Denies fatigue, weakness, SOB, chest pain, n/v/d, abdominal pain. Pt also reports only taking his morning dose of coreg this am and occasionally titrates his BP medications.    In ED, Pt afebrile, hypertensive BP max 185/92 VS otherwise stable. CBC and CMP unremarkable, d dimer wnl, Trop 0.006. ECG sinus tachycardia, 1st degree AV block and IVCD. CXR no acute cardiopulmonary disease. Cardiology consulted in ED, planning for stress echo.  mg x1

## 2022-02-21 NOTE — MEDICAL/APP STUDENT
[facility]  Consult Note    Patient Name: Ruel Santacruz MD    MRN: [id number]    Admission Date: 2/21/2022   Hospital Length of Stay: 0   Attending Physician: Quentin Rodriguez MD   Primary Care Provider: Lety Rdz MD           Patient information was obtained from patient, past medical records and ER records.       Subjective:   HPI:    67yo man w/ pmhx of HTN and hyperuricemia.    Presented to ED w/ palpitations. Sx began Weds and prompted pt to present to [facility] ED, cardiac enzymes and echo were normal, had few runs of PVCs- dc home w/ halter monitor for 48hrs, turned it in and showed 1% PVC burden. Sx returned intermittently that occur aprox every 30 mins (not associated w/ exertion) and he presented to [facility] after noticing palpitations while awakening to use the restroom. Denies chest pain, lightheadedness, syncope, N/V, or SOB. No personal hx of CVD. Family history of Afib and HTN in his brother.. Lives an active lifestyle, recently ran 15K w/out issues. Non smoker.  Pt reports measuring BP regularly at home, 120s systolic usually with increases during palpitation episodes. Takes carvedilol and lisinopril for BP management and rosuvastatin for HLD.      Review of Systems   Constitutional: Negative.    HENT: Negative.    Eyes: Negative.    Respiratory: Negative.    Cardiovascular: Positive for palpitations. Negative for chest pain and leg swelling.   Gastrointestinal: Negative.    Genitourinary: Negative.    Musculoskeletal: Negative.    Skin: Negative.    Neurological: Negative.    Endo/Heme/Allergies: Negative.    Psychiatric/Behavioral: Negative.      Objective:   Vitals (24hr Range)  Temp:  [98.5 °F (36.9 °C)-98.6 °F (37 °C)]   Pulse:  [57-95]   Resp:  [18]   BP: (157-185)/()   SpO2:  [98 %-100 %]     Physical Exam  Vitals reviewed.   Constitutional:       Appearance: Normal appearance.   HENT:      Head: Normocephalic and atraumatic.   Cardiovascular:      Rate and  Rhythm: Normal rate and regular rhythm.      Pulses: Normal pulses.      Heart sounds: Normal heart sounds.   Pulmonary:      Effort: Pulmonary effort is normal.      Breath sounds: Normal breath sounds.   Abdominal:      General: Abdomen is flat.      Palpations: Abdomen is soft.   Musculoskeletal:      Right lower leg: No edema.      Left lower leg: No edema.   Skin:     General: Skin is warm and dry.   Neurological:      Mental Status: He is alert. Mental status is at baseline.         Significant Labs:    CBC  Recent Labs   Lab 02/16/22  0942 02/21/22  0555   WBC 4.67 4.86   HGB 15.0 15.7   HCT 45.0 46.8   * 136*       CMP  Recent Labs   Lab 02/16/22  0942 02/21/22  0555    141   K 4.4 4.0    109   CO2 22* 23   ANIONGAP 9 9   BUN 15 15   CREATININE 1.0 0.9   GLU 79 99   CALCIUM 9.6 9.4   MG 2.0 1.9   PHOS 2.7  --    LIPASE  --  21   ALKPHOS 62 60   ALT 27 24   AST 25 19   ALBUMIN 4.1 4.0   PROT 7.1 6.8   BILITOT 0.4 0.6     Troponin: <.006 -> .007       EKG: Multi beat PVCs seen on two separate EKGs. Sinus rhythm and right bunble branch like morphology seen.    Echo at [facility] 2/16: normal LVEF 55% and no WMA. RV upper limi of normal in size      Imaging Results          X-Ray Chest AP Portable (Final result)  Result time 02/21/22 06:17:13    Final result by Jaime Dior MD (02/21/22 06:17:13)                 Impression:      No acute cardiopulmonary disease.    Electronically signed by resident: Arpita Iqbal  Date:    02/21/2022  Time:    06:03    Electronically signed by: Jaime Dior MD  Date:    02/21/2022  Time:    06:17             Narrative:    EXAMINATION:  XR CHEST AP PORTABLE    CLINICAL HISTORY:  chest heaviness;    TECHNIQUE:  Single frontal view of the chest was performed.    COMPARISON:  Chest radiograph 02/16/2022    FINDINGS:  Cardiac monitoring leads overlying the chest.    Mediastinal structures are midline. Cardiac silhouette and pulmonary vascular distribution  are normal.    Lung volumes are normal and symmetric. No pulmonary disease, pleural fluid, pneumothorax, pneumomediastinum, pneumoperitoneum, or significant osseous abnormality.                                   Assessment/Plan:   65yo man w/ HTN and symptomatic PVCs.    PVCs:  -Consult EP, appreciate recs  -Stess echo today  -Monitor telemetry   -Electrolyte management (K+ >4, Mg >2)  -Control BP  -Avoid caffeine    HTN:  -continue home lisinopril 10mg qd  -continue home corvedilol 12.5mg bid     HLD: (LDL 61)   -continue home rosuvastatin 20mg qd      Thank you for the consult, we will follow up.    Leigh Ann Brantley MS-4   [employer]

## 2022-02-21 NOTE — SUBJECTIVE & OBJECTIVE
Past Medical History:   Diagnosis Date    Primary hypertension 2/16/2022    Sensorineural hearing loss, unspecified     Tear of lateral cartilage or meniscus of knee, current        Past Surgical History:   Procedure Laterality Date    APPENDECTOMY      BCC- nose      COLONOSCOPY N/A 10/2/2020    Procedure: COLONOSCOPY;  Surgeon: Art Seymour MD;  Location: [facility] (4TH FLR);  Service: Endoscopy;  Laterality: N/A;  outside referral received, see -BB  covid 9/29/2085-[id number]-RT  pt requested a Friday appt-BB    CYSTOSCOPY Left 7/30/2018    Procedure: CYSTOSCOPY;  Surgeon: Farida Schneider MD;  [facility];  Service: Urology;  Laterality: Left;    KNEE SURGERY  4/2012    left knee- arthroscopic-- torn meniscus    LASER LITHOTRIPSY Left 7/30/2018    Procedure: LITHOTRIPSY, USING LASER;  Surgeon: Farida Schneider MD;  Location: [facility] OR [address] [place] [address];  Service: Urology;  Laterality: Left;    RETROGRADE PYELOGRAPHY Bilateral 7/30/2018    Procedure: PYELOGRAM, RETROGRADE;  Surgeon: Farida Schneider MD;  Location: [facility] OR [address] [place] [address];  Service: Urology;  Laterality: Bilateral;    URETEROSCOPIC REMOVAL OF URETERIC CALCULUS Bilateral 7/30/2018    Procedure: REMOVAL, CALCULUS, URETER, URETEROSCOPIC;  Surgeon: Farida Schneider MD;  [facility];  Service: Urology;  Laterality: Bilateral;    URETEROSCOPY Bilateral 7/30/2018    Procedure: URETEROSCOPY;  Surgeon: Farida Schneider MD;  Location: [facility] OR [address] [place] [address];  Service: Urology;  Laterality: Bilateral;       Review of patient's allergies indicates:   Allergen Reactions    Contrast media      Iv dye       No current facility-administered medications on file prior to encounter.     Current Outpatient Medications on File Prior to Encounter   Medication Sig    allopurinoL (ZYLOPRIM) 300 MG tablet Take 1 tablet (300 mg total) by mouth once daily.    aspirin (ECOTRIN) 81 MG EC tablet Take 1 tablet (81 mg total) by mouth  once daily.    carvediloL (COREG) 12.5 MG tablet Take 1 tablet (12.5 mg total) by mouth 2 (two) times daily.    desonide (DESOWEN) 0.05 % cream Apply topically 2 (two) times daily. To be applied on the skin of the face and neck 30 mins after efudex    diltiaZEM (DILACOR XR) 120 MG CDCR Take 1 capsule (120 mg total) by mouth once daily.    fluorouracil (EFUDEX) 5 % cream Apply topically 2 (two) times daily. Apply a thin layer to the face and scalp for 2 weeks, avoid excessive sun exposure during treatment (Patient not taking: Reported on 6/8/2021)    hydroCHLOROthiazide (HYDRODIURIL) 12.5 MG Tab Take 1 tablet (12.5 mg total) by mouth once daily.    lisinopriL 10 MG tablet Take 1 tablet (10 mg total) by mouth 2 (two) times daily. (Patient taking differently: Take 10 mg by mouth once daily.)    potassium citrate (UROCIT-K 10) 10 mEq (1,080 mg) TbSR Take 1 tablet (10 mEq total) by mouth 3 (three) times daily with meals.    rosuvastatin (CRESTOR) 20 MG tablet TAKE 1 TABLET BY MOUTH EVERY DAY IN THE EVENING    sildenafil (VIAGRA) 100 MG tablet Take 100 mg by mouth daily as needed for Erectile Dysfunction.    tadalafil (CIALIS) 20 MG Tab Take 1 tablet (20 mg total) by mouth as needed. 1 tab as needed every 36 hours    tamsulosin (FLOMAX) 0.4 mg Cap     vitamin D (VITAMIN D3) 1000 units Tab Take 1,000 Units by mouth once daily.     Family History       Problem Relation (Age of Onset)    Atrial fibrillation Brother    Cancer Mother, Father, Brother    Hypertension Brother    Thyroid nodules Brother          Tobacco Use    Smoking status: Never Smoker    Smokeless tobacco: Never Used   Substance and Sexual Activity    Alcohol use: No    Drug use: Not on file    Sexual activity: Not on file     Review of Systems   Constitutional:  Negative for activity change, chills, diaphoresis, fatigue and fever.   HENT:  Negative for congestion, facial swelling, rhinorrhea and sore throat.    Eyes:  Negative for photophobia, itching and  visual disturbance.   Respiratory:  Negative for cough, chest tightness, shortness of breath and wheezing.    Cardiovascular:  Positive for palpitations. Negative for chest pain and leg swelling.   Gastrointestinal:  Negative for abdominal distention, abdominal pain, blood in stool, constipation, diarrhea, nausea and vomiting.   Genitourinary:  Negative for difficulty urinating, dysuria, frequency, hematuria and urgency.   Musculoskeletal:  Negative for arthralgias, back pain and neck stiffness.   Neurological:  Negative for dizziness, tremors, seizures, syncope, weakness, light-headedness, numbness and headaches.   Psychiatric/Behavioral:  Negative for agitation, confusion and hallucinations.    Objective:     Vital Signs (Most Recent):  Temp: 98.7 °F (37.1 °C) (02/21/22 1232)  Pulse: (!) 59 (02/21/22 1232)  Resp: 18 (02/21/22 1232)  BP: (!) 162/78 (02/21/22 1232)  SpO2: 95 % (02/21/22 1232)   Vital Signs (24h Range):  Temp:  [98.5 °F (36.9 °C)-98.7 °F (37.1 °C)] 98.7 °F (37.1 °C)  Pulse:  [56-95] 59  Resp:  [18] 18  SpO2:  [95 %-100 %] 95 %  BP: (157-185)/() 162/78     Weight: 90.7 kg (200 lb)  Body mass index is 29.53 kg/m².    Physical Exam  Vitals and nursing note reviewed.   Constitutional:       General: He is not in acute distress.     Appearance: He is well-developed. He is not diaphoretic.   HENT:      Head: Normocephalic and atraumatic.      Right Ear: External ear normal.      Left Ear: External ear normal.      Nose: Nose normal. No congestion.      Mouth/Throat:      Pharynx: Oropharynx is clear.   Eyes:      General: No scleral icterus.     Extraocular Movements: Extraocular movements intact.   Cardiovascular:      Rate and Rhythm: Normal rate and regular rhythm.      Pulses: Normal pulses.      Heart sounds: Normal heart sounds. No murmur heard.  Pulmonary:      Effort: Pulmonary effort is normal. No respiratory distress.      Breath sounds: Normal breath sounds. No wheezing or rales.    Abdominal:      General: Bowel sounds are normal. There is no distension.      Palpations: Abdomen is soft.      Tenderness: There is no abdominal tenderness. There is no guarding or rebound.   Musculoskeletal:      Cervical back: Normal range of motion.      Right lower leg: No edema.      Left lower leg: No edema.   Skin:     General: Skin is warm and dry.      Capillary Refill: Capillary refill takes less than 2 seconds.   Neurological:      General: No focal deficit present.      Mental Status: He is alert and oriented to person, place, and time. Mental status is at baseline.   Psychiatric:         Mood and Affect: Mood normal.         Behavior: Behavior normal.         Thought Content: Thought content normal.           Significant Labs: All pertinent labs within the past 24 hours have been reviewed.    Significant Imaging: I have reviewed all pertinent imaging results/findings within the past 24 hours.  Imaging Results              X-Ray Chest AP Portable (Final result)  Result time 02/21/22 06:17:13      Final result by Jaime Dior MD (02/21/22 06:17:13)                   Impression:      No acute cardiopulmonary disease.    Electronically signed by resident: Arpita Iqbal  Date:    02/21/2022  Time:    06:03    Electronically signed by: Jaime Dior MD  Date:    02/21/2022  Time:    06:17               Narrative:    EXAMINATION:  XR CHEST AP PORTABLE    CLINICAL HISTORY:  chest heaviness;    TECHNIQUE:  Single frontal view of the chest was performed.    COMPARISON:  Chest radiograph 02/16/2022    FINDINGS:  Cardiac monitoring leads overlying the chest.    Mediastinal structures are midline. Cardiac silhouette and pulmonary vascular distribution are normal.    Lung volumes are normal and symmetric. No pulmonary disease, pleural fluid, pneumothorax, pneumomediastinum, pneumoperitoneum, or significant osseous abnormality.

## 2022-02-21 NOTE — ASSESSMENT & PLAN NOTE
Patient presents with palpitations and chest pressure. Not associated with exertion although he does have mild left sided pain when he begins a run and then goes away. Initial ECG with sinus tachycardia, 1st degree AV block and IVCD.When his HR decreases to 60s his QRS normalizes. His symptoms are consistent with PVCs or runs of PVCs.   -First troponin is negative and has another negative troponin on 2/16. Will check one more.  -  TTE on 2/16 at [facility] showed normal LVEF with no WMA. RV size upper limit of normal.   - He is at intermediate pretest probability for CAD, will evaluate with stress echo today. Will also be able to evaluate PVC burden while exercising.

## 2022-02-21 NOTE — ED TRIAGE NOTES
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 Pt states he was seen at [facility] last week and had a cardiac workup but nothing popped up alarming. Pt states other than HTN he has no cardiac history. Pt states he wore a holter monitor for 48 hours and turned it in on Friday to cards but hasn't heard anything since then. Pt states he work up this morning, had a BM, then shortly after began feeling the heaviness which turned into palpitations. Pt states that nothing really triggers the symptoms and he denies any dizziness or associated weakness.     Triage note:  Chief Complaint   Patient presents with    Chest Pain     Starting 2 hours ago, described at chest heaviness. States seen at [facility] last week for same complaint with full cardiac work up that was unremarkable, PVCs noted during stay in ED and patient feels he is having runs of PVCs currently. Denies sob, N/V.      Review of patient's allergies indicates:   Allergen Reactions    Contrast media      Iv dye     Past Medical History:   Diagnosis Date    Primary hypertension 2/16/2022    Sensorineural hearing loss, unspecified     Tear of lateral cartilage or meniscus of knee, current

## 2022-02-21 NOTE — ASSESSMENT & PLAN NOTE
Palpitations  PVC    -HR 63, /92  -ECG with sinus tachycardia, 1st degree AV block and IVCD.  -Trop <0.006, 0.007 flat  -CXR negative  -Heart Score: 4  -Cardiology consulted in ED, recs appreciated  -last echo reviewed, below  -Stress echo pending    Results for orders placed during the hospital encounter of 02/16/22    Echo    Interpretation Summary  1. The left ventricle (LV) is normal in size with normal systolic function. Normal LV geometry.  2. There are no significant regional wall motion abnormalities.  3. LV ejection fraction is 55-60%. Normal LV diastolic function.  4. The right ventricle (RV) is upper limit normal in size with normal systolic function. RV strain is -22%.  5. Estimated RVSP is normal (less than 40 mmHg). Pulmonary hypertension is absent.  6. Normal sized atria.  7. Mild mitral and tricuspid regurgitation.  8. When compared to the most recent prior study available, no clinically significant changes.

## 2022-02-21 NOTE — PLAN OF CARE
[facility] - Telemetry Stepdown ([facility])  Discharge Assessment    Primary Care Provider: Lety Rdz MD     Discharge Assessment (most recent)     BRIEF DISCHARGE ASSESSMENT - 02/21/22 3566        Discharge Planning    Assessment Type Discharge Planning Brief Assessment     Resource/Environmental Concerns none     Support Systems Spouse/significant other     Equipment Currently Used at Home none     Current Living Arrangements home/apartment/condo     Patient/Family Anticipates Transition to home     Patient/Family Anticipated Services at Transition none     DME Needed Upon Discharge  none     Discharge Plan A Home     Discharge Plan B Home               Pt is a 66 y.o. male admitted with Chest Pain and palpitations. He has a PMH of HTN. He lives with his wife in a house with no steps to enter. He has not required DME in the past, works full time and is independent with all IADLs and ADLs. [facility] Discharge Packet given to patient and/or family with understanding verbalized.   name and number and estimated discharge date written on white board in patient's room with request to call for any questions or concerns.  Will continue to follow for needs.  Logan Dubon RN,BSN

## 2022-02-21 NOTE — H&P
[facility] - Telemetry [facility] ([address] Medicine  History & Physical    Patient Name: Ruelvilma Santacruz MD  MRN: [id number]  Patient Class: OP- Observation  Admission Date: 2/21/2022  Attending Physician: Quentin Rodriguez MD   Primary Care Provider: Lety Rdz MD         Patient information was obtained from patient, past medical records and ER records.     Subjective:     Principal Problem:Chest pressure    Chief Complaint:   Chief Complaint   Patient presents with    Chest Pain     Starting 2 hours ago, described at chest heaviness. States seen at [facility] last week for same complaint with full cardiac work up that was unremarkable, PVCs noted during stay in ED and patient feels he is having runs of PVCs currently. Denies sob, N/V.         HPI:  Neeta is a 66 y.o. male w/ pmhx of HTN, who presents w/ c/o of palpitations. Pt states he woke up at 330 am and went to the bathroom. He then started feeling palpitations and chest pressure again, intermittent, not associated with exertion. Of note, On Wednesday he was in clinic and started having palpitations associated with chest pressure. He went to the ED at [facility], cardiac enzymes and echo were normal and was discharged home. He wore a Holter for 48rs to be interpreted by his cardiologist. Pt states other than HTN he has no cardiac history. No prior history of cardiac disease. He is active. Walked/ran 4 miles yesterday and 9 miles one week. Pt states that nothing really triggers the symptoms and he denies any dizziness or associated weakness. Denies fatigue, weakness, SOB, chest pain, n/v/d, abdominal pain. Pt also reports only taking his morning dose of coreg this am and occasionally titrates his BP medications.    In ED, Pt afebrile, hypertensive BP max 185/92 VS otherwise stable. CBC and CMP unremarkable, d dimer wnl, Trop 0.006. ECG sinus tachycardia, 1st degree AV block and IVCD. CXR no acute cardiopulmonary disease. Cardiology  consulted in ED, planning for stress echo.  mg x1      Past Medical History:   Diagnosis Date    Primary hypertension 2/16/2022    Sensorineural hearing loss, unspecified     Tear of lateral cartilage or meniscus of knee, current        Past Surgical History:   Procedure Laterality Date    APPENDECTOMY      BCC- nose      COLONOSCOPY N/A 10/2/2020    Procedure: COLONOSCOPY;  Surgeon: Art Seymour MD;  Location: [facility] (4TH FLR);  Service: Endoscopy;  Laterality: N/A;  outside referral received, see -BB  covid 9/29/2089-[id number]  pt requested a Friday appt-BB    CYSTOSCOPY Left 7/30/2018    Procedure: CYSTOSCOPY;  Surgeon: Farida Schneider MD;  Location: [facility] OR [address] [place] [address];  Service: Urology;  Laterality: Left;    KNEE SURGERY  4/2012    left knee- arthroscopic-- torn meniscus    LASER LITHOTRIPSY Left 7/30/2018    Procedure: LITHOTRIPSY, USING LASER;  Surgeon: Farida Schneider MD;  Location: [facility] OR [address] [place] [address];  Service: Urology;  Laterality: Left;    RETROGRADE PYELOGRAPHY Bilateral 7/30/2018    Procedure: PYELOGRAM, RETROGRADE;  Surgeon: Farida Schneider MD;  Location: [facility] OR [address] [place] [address];  Service: Urology;  Laterality: Bilateral;    URETEROSCOPIC REMOVAL OF URETERIC CALCULUS Bilateral 7/30/2018    Procedure: REMOVAL, CALCULUS, URETER, URETEROSCOPIC;  Surgeon: Farida Schneider MD;  Location: [facility] OR [address] [place] [address];  Service: Urology;  Laterality: Bilateral;    URETEROSCOPY Bilateral 7/30/2018    Procedure: URETEROSCOPY;  Surgeon: Farida Schneider MD;  Location: [facility] OR [address] [place] [address];  Service: Urology;  Laterality: Bilateral;       Review of patient's allergies indicates:   Allergen Reactions    Contrast media      Iv dye       No current facility-administered medications on file prior to encounter.     Current Outpatient Medications on File Prior to Encounter   Medication Sig    allopurinoL (ZYLOPRIM) 300 MG tablet Take 1 tablet (300 mg total) by mouth once daily.     aspirin (ECOTRIN) 81 MG EC tablet Take 1 tablet (81 mg total) by mouth once daily.    carvediloL (COREG) 12.5 MG tablet Take 1 tablet (12.5 mg total) by mouth 2 (two) times daily.    desonide (DESOWEN) 0.05 % cream Apply topically 2 (two) times daily. To be applied on the skin of the face and neck 30 mins after efudex    diltiaZEM (DILACOR XR) 120 MG CDCR Take 1 capsule (120 mg total) by mouth once daily.    fluorouracil (EFUDEX) 5 % cream Apply topically 2 (two) times daily. Apply a thin layer to the face and scalp for 2 weeks, avoid excessive sun exposure during treatment (Patient not taking: Reported on 6/8/2021)    hydroCHLOROthiazide (HYDRODIURIL) 12.5 MG Tab Take 1 tablet (12.5 mg total) by mouth once daily.    lisinopriL 10 MG tablet Take 1 tablet (10 mg total) by mouth 2 (two) times daily. (Patient taking differently: Take 10 mg by mouth once daily.)    potassium citrate (UROCIT-K 10) 10 mEq (1,080 mg) TbSR Take 1 tablet (10 mEq total) by mouth 3 (three) times daily with meals.    rosuvastatin (CRESTOR) 20 MG tablet TAKE 1 TABLET BY MOUTH EVERY DAY IN THE EVENING    sildenafil (VIAGRA) 100 MG tablet Take 100 mg by mouth daily as needed for Erectile Dysfunction.    tadalafil (CIALIS) 20 MG Tab Take 1 tablet (20 mg total) by mouth as needed. 1 tab as needed every 36 hours    tamsulosin (FLOMAX) 0.4 mg Cap     vitamin D (VITAMIN D3) 1000 units Tab Take 1,000 Units by mouth once daily.     Family History       Problem Relation (Age of Onset)    Atrial fibrillation Brother    Cancer Mother, Father, Brother    Hypertension Brother    Thyroid nodules Brother          Tobacco Use    Smoking status: Never Smoker    Smokeless tobacco: Never Used   Substance and Sexual Activity    Alcohol use: No    Drug use: Not on file    Sexual activity: Not on file     Review of Systems   Constitutional:  Negative for activity change, chills, diaphoresis, fatigue and fever.   HENT:  Negative for congestion,  facial swelling, rhinorrhea and sore throat.    Eyes:  Negative for photophobia, itching and visual disturbance.   Respiratory:  Negative for cough, chest tightness, shortness of breath and wheezing.    Cardiovascular:  Positive for palpitations. Negative for chest pain and leg swelling.   Gastrointestinal:  Negative for abdominal distention, abdominal pain, blood in stool, constipation, diarrhea, nausea and vomiting.   Genitourinary:  Negative for difficulty urinating, dysuria, frequency, hematuria and urgency.   Musculoskeletal:  Negative for arthralgias, back pain and neck stiffness.   Neurological:  Negative for dizziness, tremors, seizures, syncope, weakness, light-headedness, numbness and headaches.   Psychiatric/Behavioral:  Negative for agitation, confusion and hallucinations.    Objective:     Vital Signs (Most Recent):  Temp: 98.7 °F (37.1 °C) (02/21/22 1232)  Pulse: (!) 59 (02/21/22 1232)  Resp: 18 (02/21/22 1232)  BP: (!) 162/78 (02/21/22 1232)  SpO2: 95 % (02/21/22 1232)   Vital Signs (24h Range):  Temp:  [98.5 °F (36.9 °C)-98.7 °F (37.1 °C)] 98.7 °F (37.1 °C)  Pulse:  [56-95] 59  Resp:  [18] 18  SpO2:  [95 %-100 %] 95 %  BP: (157-185)/() 162/78     Weight: 90.7 kg (200 lb)  Body mass index is 29.53 kg/m².    Physical Exam  Vitals and nursing note reviewed.   Constitutional:       General: He is not in acute distress.     Appearance: He is well-developed. He is not diaphoretic.   HENT:      Head: Normocephalic and atraumatic.      Right Ear: External ear normal.      Left Ear: External ear normal.      Nose: Nose normal. No congestion.      Mouth/Throat:      Pharynx: Oropharynx is clear.   Eyes:      General: No scleral icterus.     Extraocular Movements: Extraocular movements intact.   Cardiovascular:      Rate and Rhythm: Normal rate and regular rhythm.      Pulses: Normal pulses.      Heart sounds: Normal heart sounds. No murmur heard.  Pulmonary:      Effort: Pulmonary effort is normal. No  respiratory distress.      Breath sounds: Normal breath sounds. No wheezing or rales.   Abdominal:      General: Bowel sounds are normal. There is no distension.      Palpations: Abdomen is soft.      Tenderness: There is no abdominal tenderness. There is no guarding or rebound.   Musculoskeletal:      Cervical back: Normal range of motion.      Right lower leg: No edema.      Left lower leg: No edema.   Skin:     General: Skin is warm and dry.      Capillary Refill: Capillary refill takes less than 2 seconds.   Neurological:      General: No focal deficit present.      Mental Status: He is alert and oriented to person, place, and time. Mental status is at baseline.   Psychiatric:         Mood and Affect: Mood normal.         Behavior: Behavior normal.         Thought Content: Thought content normal.           Significant Labs: All pertinent labs within the past 24 hours have been reviewed.    Significant Imaging: I have reviewed all pertinent imaging results/findings within the past 24 hours.  Imaging Results              X-Ray Chest AP Portable (Final result)  Result time 02/21/22 06:17:13      Final result by Jaime Dior MD (02/21/22 06:17:13)                   Impression:      No acute cardiopulmonary disease.    Electronically signed by resident: Arpita Iqbal  Date:    02/21/2022  Time:    06:03    Electronically signed by: Jaime Dior MD  Date:    02/21/2022  Time:    06:17               Narrative:    EXAMINATION:  XR CHEST AP PORTABLE    CLINICAL HISTORY:  chest heaviness;    TECHNIQUE:  Single frontal view of the chest was performed.    COMPARISON:  Chest radiograph 02/16/2022    FINDINGS:  Cardiac monitoring leads overlying the chest.    Mediastinal structures are midline. Cardiac silhouette and pulmonary vascular distribution are normal.    Lung volumes are normal and symmetric. No pulmonary disease, pleural fluid, pneumothorax, pneumomediastinum, pneumoperitoneum, or significant osseous  abnormality.                                       Assessment/Plan:     * Chest pressure  Palpitations  PVC    -HR 63, /92  -ECG with sinus tachycardia, 1st degree AV block and IVCD.  -Trop <0.006, 0.007 flat  -CXR negative  -Heart Score: 4  -Cardiology consulted in ED, recs appreciated  -last echo reviewed, below  -Stress echo pending    Results for orders placed during the hospital encounter of 02/16/22    Echo    Interpretation Summary  1. The left ventricle (LV) is normal in size with normal systolic function. Normal LV geometry.  2. There are no significant regional wall motion abnormalities.  3. LV ejection fraction is 55-60%. Normal LV diastolic function.  4. The right ventricle (RV) is upper limit normal in size with normal systolic function. RV strain is -22%.  5. Estimated RVSP is normal (less than 40 mmHg). Pulmonary hypertension is absent.  6. Normal sized atria.  7. Mild mitral and tricuspid regurgitation.  8. When compared to the most recent prior study available, no clinically significant changes.    Primary hypertension  -continue coreg 12.5 mg bid, lisinopril 10 mg qd      Hyperuricemia  -allopurinol 300 mg qd      VTE Risk Mitigation (From admission, onward)         Ordered     IP VTE LOW RISK PATIENT  Once         02/21/22 0749     Place sequential compression device  Until discontinued         02/21/22 0749                   Ray Malone PA-C  Department of Hospital Medicine   [facility] - Telemetry Stepdown ([place]-)

## 2022-02-21 NOTE — HPI
67 y/o M with hypertension. Presents to ED complaining of palpitations and chest pressure. 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 On Wednesday he was in clinic and started having palpitations associated with chest pressure. He went to the ED at [employer], cardiac enzymes and echo were normal and was discharged home. He wore a Holter for 48rs that showed <0.01% of PVCs and no arrhythmias. This morning, he woke up at 330 am and went to the bathroom. He then started feeling palpitations and chest pressure again, intermittent, not associated with exertion. No prior history of cardiac disease. He is active. Walked/ran 4 miles yesterday and 9 miles one week ago.

## 2022-02-21 NOTE — SUBJECTIVE & OBJECTIVE
Past Medical History:   Diagnosis Date    Primary hypertension 2/16/2022    Sensorineural hearing loss, unspecified     Tear of lateral cartilage or meniscus of knee, current        Past Surgical History:   Procedure Laterality Date    APPENDECTOMY      BCC- nose      COLONOSCOPY N/A 10/2/2020    Procedure: COLONOSCOPY;  Surgeon: Art Seymour MD;  Location: [facility] (4TH FLR);  Service: Endoscopy;  Laterality: N/A;  outside referral received, see -BB  covid 9/29/2067-[id number]  pt requested a Friday appt-BB    CYSTOSCOPY Left 7/30/2018    Procedure: CYSTOSCOPY;  Surgeon: Farida Schneider MD;  [facility];  Service: Urology;  Laterality: Left;    KNEE SURGERY  4/2012    left knee- arthroscopic-- torn meniscus    LASER LITHOTRIPSY Left 7/30/2018    Procedure: LITHOTRIPSY, USING LASER;  Surgeon: Farida Schneider MD;  Location: [facility] OR [address] [place] [address];  Service: Urology;  Laterality: Left;    RETROGRADE PYELOGRAPHY Bilateral 7/30/2018    Procedure: PYELOGRAM, RETROGRADE;  Surgeon: Farida Schneider MD;  Location: [facility] OR [address] [place] [address];  Service: Urology;  Laterality: Bilateral;    URETEROSCOPIC REMOVAL OF URETERIC CALCULUS Bilateral 7/30/2018    Procedure: REMOVAL, CALCULUS, URETER, URETEROSCOPIC;  Surgeon: Farida Schneider MD;  [facility];  Service: Urology;  Laterality: Bilateral;    URETEROSCOPY Bilateral 7/30/2018    Procedure: URETEROSCOPY;  Surgeon: Farida Schneider MD;  Location: [facility] OR [address] [place] [address];  Service: Urology;  Laterality: Bilateral;       Review of patient's allergies indicates:   Allergen Reactions    Contrast media      Iv dye       No current facility-administered medications on file prior to encounter.     Current Outpatient Medications on File Prior to Encounter   Medication Sig    allopurinoL (ZYLOPRIM) 300 MG tablet Take 1 tablet (300 mg total) by mouth once daily.    aspirin (ECOTRIN) 81 MG EC tablet Take 1 tablet (81 mg total) by mouth  once daily.    carvediloL (COREG) 12.5 MG tablet Take 1 tablet (12.5 mg total) by mouth 2 (two) times daily.    desonide (DESOWEN) 0.05 % cream Apply topically 2 (two) times daily. To be applied on the skin of the face and neck 30 mins after efudex    diltiaZEM (DILACOR XR) 120 MG CDCR Take 1 capsule (120 mg total) by mouth once daily.    fluorouracil (EFUDEX) 5 % cream Apply topically 2 (two) times daily. Apply a thin layer to the face and scalp for 2 weeks, avoid excessive sun exposure during treatment (Patient not taking: Reported on 6/8/2021)    hydroCHLOROthiazide (HYDRODIURIL) 12.5 MG Tab Take 1 tablet (12.5 mg total) by mouth once daily.    lisinopriL 10 MG tablet Take 1 tablet (10 mg total) by mouth 2 (two) times daily. (Patient taking differently: Take 10 mg by mouth once daily.)    potassium citrate (UROCIT-K 10) 10 mEq (1,080 mg) TbSR Take 1 tablet (10 mEq total) by mouth 3 (three) times daily with meals.    rosuvastatin (CRESTOR) 20 MG tablet TAKE 1 TABLET BY MOUTH EVERY DAY IN THE EVENING    sildenafil (VIAGRA) 100 MG tablet Take 100 mg by mouth daily as needed for Erectile Dysfunction.    tadalafil (CIALIS) 20 MG Tab Take 1 tablet (20 mg total) by mouth as needed. 1 tab as needed every 36 hours    tamsulosin (FLOMAX) 0.4 mg Cap     vitamin D (VITAMIN D3) 1000 units Tab Take 1,000 Units by mouth once daily.     Family History       Problem Relation (Age of Onset)    Atrial fibrillation Brother    Cancer Mother, Father, Brother    Hypertension Brother    Thyroid nodules Brother          Tobacco Use    Smoking status: Never Smoker    Smokeless tobacco: Never Used   Substance and Sexual Activity    Alcohol use: No    Drug use: Not on file    Sexual activity: Not on file     ROS  Objective:     Vital Signs (Most Recent):  Temp: 98.7 °F (37.1 °C) (02/21/22 1232)  Pulse: (!) 59 (02/21/22 1232)  Resp: 18 (02/21/22 1232)  BP: (!) 162/78 (02/21/22 1232)  SpO2: 95 % (02/21/22 1232)   Vital Signs (24h  Range):  Temp:  [98.5 °F (36.9 °C)-98.7 °F (37.1 °C)] 98.7 °F (37.1 °C)  Pulse:  [56-95] 59  Resp:  [18] 18  SpO2:  [95 %-100 %] 95 %  BP: (157-185)/() 162/78       Weight: 88.5 kg (195 lb)  Body mass index is 28.8 kg/m².    SpO2: 95 %  O2 Device (Oxygen Therapy): room air    Physical Exam    Significant Labs: CMP:   Recent Labs   Lab 02/21/22  0555      K 4.0      CO2 23   GLU 99   BUN 15   CREATININE 0.9   CALCIUM 9.4   PROT 6.8   ALBUMIN 4.0   BILITOT 0.6   ALKPHOS 60   AST 19   ALT 24   ANIONGAP 9   ESTGFRAFRICA >60.0   EGFRNONAA >60.0   , CBC:   Recent Labs   Lab 02/21/22  0555   WBC 4.86   HGB 15.7   HCT 46.8   *   , and Troponin   Recent Labs   Lab 02/21/22  0555 02/21/22  0825   TROPONINI <0.006 0.007       Significant Imaging: Echocardiogram: 2D echo with color flow doppler: No results found for this or any previous visit. and Transthoracic echo (TTE) complete ([facility] Only):   Results for orders placed or performed during the hospital encounter of 02/16/22   Echo   Result Value Ref Range    BSA 2.07 m2    TDI SEPTAL 0.10 m/s    LV LATERAL E/E' RATIO 6.67 m/s    LV SEPTAL E/E' RATIO 8.00 m/s    LA WIDTH 3.99 cm    Right Atrial Pressure (from IVC) 3 mmHg    IVC diameter 1.06 cm    RV mid diameter 2.80 cm    EF 55 %    Left Ventricular Outflow Tract Mean Velocity 0.81864240764868 cm/s    Left Ventricular Outflow Tract Mean Gradient 2.90 mmHg    Pulmonary Valve Mean Velocity 0.69 m/s    TDI LATERAL 0.12 m/s    PV PEAK VELOCITY 1.03 cm/s    LVIDd 4.25 3.5 - 6.0 cm    IVS 0.72 0.6 - 1.1 cm    Posterior Wall 0.71 0.6 - 1.1 cm    LVIDs 2.66 2.1 - 4.0 cm    FS 37 28 - 44 %    LA volume 50.22 cm3    Sinus 3.82 cm    STJ 3.17 cm    Ascending aorta 3.31 cm    LV mass 89.20 g    LA size 2.86 cm    RVDD 3.57 cm    TAPSE 3.04 cm    Right ventricular length in diastole (apical 4-chamber view) 8.28 cm    RV S' 16 cm/s    RA area 20.3 cm2    Left Ventricle Relative Wall Thickness 0.33 cm    AV mean  gradient 3 mmHg    AV valve area 3.07 cm2    AV Velocity Ratio 0.96     AV index (prosthetic) 0.98     E/A ratio 1.10     Mean e' 0.11 m/s    E wave deceleration time 218.21845462925615 msec    IVRT 128.224345520040296 msec    Pulm vein S/D ratio 1.09     LVOT diameter 2.00 cm    LVOT area 3.1 cm2    LVOT peak tony 1.31 m/s    LVOT peak VTI 26.40 cm    Ao peak tony 1.36 m/s    Ao VTI 27.0 cm    LVOT stroke volume 82.90 cm3    AV peak gradient 7 mmHg    TV rest pulmonary artery pressure 31 mmHg    E/E' ratio 7.27 m/s    MV Peak E Tony 0.80 m/s    TR Max Tony 2.64 m/s    MV Peak A Tony 0.73 m/s    PV Peak S Tony 0.86418568783432 m/s    PV Peak D Tony 0.44 m/s    LV Systolic Volume 25.99 mL    LV Systolic Volume Index 12.7 mL/m2    LV Diastolic Volume 80.64 mL    LV Diastolic Volume Index 39.53 mL/m2    LA Volume Index 24.6 mL/m2    LV Mass Index 44 g/m2    Right Atrium Volume Systolic 63.24 mL    Echo EF Estimated 68 %    RA Major Axis 5.46 cm    Left Atrium Minor Axis 5.45 cm    Left Atrium Major Axis 4.93 cm    Triscuspid Valve Regurgitation Peak Gradient 28 mmHg    LA Volume Index (Mod) 18.8 mL/m2    LA volume (mod) 38.33 cm3    RA Width 3.38 cm    Narrative    1. The left ventricle (LV) is normal in size with normal systolic   function. Normal LV geometry.  2. There are no significant regional wall motion abnormalities.  3. LV ejection fraction is 55-60%. Normal LV diastolic function.  4. The right ventricle (RV) is upper limit normal in size with normal   systolic function. RV strain is -22%.  5. Estimated RVSP is normal (less than 40 mmHg). Pulmonary hypertension is   absent.  6. Normal sized atria.  7. Mild mitral and tricuspid regurgitation.  8. When compared to the most recent prior study available, no clinically   significant changes.      and EK ecgs showing AIVR

## 2022-02-21 NOTE — CONSULTS
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 He reports he's had brief episodes of palpitations for a while, so he was started on diltiazem. He later noticed that his BP was not well controlled on Dilt, so he switched to Coreg. 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 He presented to [facility] ER and had negative troponin tests and an echo which was unremarkable. He was discharged with a 48 hour holter, which only showed 26 pvcs. Pt stated that he self-discontinued off his coreg for 1.5 days since he read that BB can provoke pvcs. He took a half dose on Sunday night and at 3a, he woke up to use bathroom and noticed persistent palpitations. He took a full dose or coreg (12.5 mg ) in the morning. He presented to [facility] for further evaluation.    Of note, patient is very active and runs routinely including a recent 15 kilometer run a few weeks ago. He states in the 20 years he's been running, he had one episode of palpitations.     In the ER, he was hemodynamically stable. ECGs were done. 2 ecgs captured him to be in a wide complex aivr with HR  and av dissociation. He was later found to be in nsr with HR 60. Of note, general cardiology fellow noticed him to be symptomatic at the time of his PVCs. He was admitted for further evaluation.     2/16/22    1. The left ventricle (LV) is normal in size with normal systolic  function. Normal LV geometry.  2. There are no significant regional wall motion abnormalities.  3. LV ejection fraction is 55-60%. Normal LV diastolic function.  4. The right ventricle (RV) is upper limit normal in size with normal systolic function. RV strain is -22%.  5. Estimated RVSP is normal (less than 40 mmHg). Pulmonary hypertension is absent.  6. Normal sized atria.  7. Mild mitral and tricuspid regurgitation.  8. When compared to the most recent prior study available, no clinically significant changes.        Past Medical History:   Diagnosis Date    Primary hypertension 2/16/2022    Sensorineural hearing loss, unspecified     Tear of lateral cartilage or meniscus of knee, current        Past Surgical History:   Procedure Laterality Date    APPENDECTOMY      BCC- nose      COLONOSCOPY N/A 10/2/2020    Procedure: COLONOSCOPY;  Surgeon: Art Seymour MD;  Location: [facility] (4TH FLR);  Service: Endoscopy;  Laterality: N/A;  outside referral received, see -BB  covid 9/29/2039-[id number]  pt requested a Friday appt-BB    CYSTOSCOPY Left 7/30/2018    Procedure: CYSTOSCOPY;  Surgeon: Farida Schneider MD;  Location: [facility] OR [address] [place] [address];  Service: Urology;  Laterality: Left;    KNEE SURGERY  4/2012    left knee- arthroscopic-- torn meniscus    LASER LITHOTRIPSY Left 7/30/2018    Procedure: LITHOTRIPSY, USING LASER;  Surgeon: Farida Schneider MD;  Location: [address];  Service: Urology;  Laterality: Left;    RETROGRADE PYELOGRAPHY Bilateral 7/30/2018    Procedure: PYELOGRAM, RETROGRADE;  Surgeon: Farida Schneider MD;  Location: [address];  Service: Urology;  Laterality: Bilateral;    URETEROSCOPIC REMOVAL OF URETERIC CALCULUS Bilateral 7/30/2018    Procedure: REMOVAL, CALCULUS, URETER, URETEROSCOPIC;  Surgeon: Farida Schneider MD;  Location: [address];  Service: Urology;  Laterality: Bilateral;    URETEROSCOPY Bilateral 7/30/2018    Procedure:  URETEROSCOPY;  Surgeon: Farida Schneider MD;  Location: [facility] OR [address] [place] [address];  Service: Urology;  Laterality: Bilateral;       Review of patient's allergies indicates:   Allergen Reactions    Contrast media      Iv dye       No current facility-administered medications on file prior to encounter.     Current Outpatient Medications on File Prior to Encounter   Medication Sig    allopurinoL (ZYLOPRIM) 300 MG tablet Take 1 tablet (300 mg total) by mouth once daily.    aspirin (ECOTRIN) 81 MG EC tablet Take 1 tablet (81 mg total) by mouth once daily.    carvediloL (COREG) 12.5 MG tablet Take 1 tablet (12.5 mg total) by mouth 2 (two) times daily.    desonide (DESOWEN) 0.05 % cream Apply topically 2 (two) times daily. To be applied on the skin of the face and neck 30 mins after efudex    diltiaZEM (DILACOR XR) 120 MG CDCR Take 1 capsule (120 mg total) by mouth once daily.    fluorouracil (EFUDEX) 5 % cream Apply topically 2 (two) times daily. Apply a thin layer to the face and scalp for 2 weeks, avoid excessive sun exposure during treatment (Patient not taking: Reported on 6/8/2021)    hydroCHLOROthiazide (HYDRODIURIL) 12.5 MG Tab Take 1 tablet (12.5 mg total) by mouth once daily.    lisinopriL 10 MG tablet Take 1 tablet (10 mg total) by mouth 2 (two) times daily. (Patient taking differently: Take 10 mg by mouth once daily.)    potassium citrate (UROCIT-K 10) 10 mEq (1,080 mg) TbSR Take 1 tablet (10 mEq total) by mouth 3 (three) times daily with meals.    rosuvastatin (CRESTOR) 20 MG tablet TAKE 1 TABLET BY MOUTH EVERY DAY IN THE EVENING    sildenafil (VIAGRA) 100 MG tablet Take 100 mg by mouth daily as needed for Erectile Dysfunction.    tadalafil (CIALIS) 20 MG Tab Take 1 tablet (20 mg total) by mouth as needed. 1 tab as needed every 36 hours    tamsulosin (FLOMAX) 0.4 mg Cap     vitamin D (VITAMIN D3) 1000 units Tab Take 1,000 Units by mouth once daily.     Family History       Problem Relation (Age of  Onset)    Atrial fibrillation Brother    Cancer Mother, Father, Brother    Hypertension Brother    Thyroid nodules Brother          Tobacco Use    Smoking status: Never Smoker    Smokeless tobacco: Never Used   Substance and Sexual Activity    Alcohol use: No    Drug use: Not on file    Sexual activity: Not on file     ROS  Objective:     Vital Signs (Most Recent):  Temp: 98.7 °F (37.1 °C) (02/21/22 1232)  Pulse: (!) 59 (02/21/22 1232)  Resp: 18 (02/21/22 1232)  BP: (!) 162/78 (02/21/22 1232)  SpO2: 95 % (02/21/22 1232)   Vital Signs (24h Range):  Temp:  [98.5 °F (36.9 °C)-98.7 °F (37.1 °C)] 98.7 °F (37.1 °C)  Pulse:  [56-95] 59  Resp:  [18] 18  SpO2:  [95 %-100 %] 95 %  BP: (157-185)/() 162/78       Weight: 88.5 kg (195 lb)  Body mass index is 28.8 kg/m².    SpO2: 95 %  O2 Device (Oxygen Therapy): room air    Physical Exam    Significant Labs: CMP:   Recent Labs   Lab 02/21/22  0555      K 4.0      CO2 23   GLU 99   BUN 15   CREATININE 0.9   CALCIUM 9.4   PROT 6.8   ALBUMIN 4.0   BILITOT 0.6   ALKPHOS 60   AST 19   ALT 24   ANIONGAP 9   ESTGFRAFRICA >60.0   EGFRNONAA >60.0   , CBC:   Recent Labs   Lab 02/21/22  0555   WBC 4.86   HGB 15.7   HCT 46.8   *   , and Troponin   Recent Labs   Lab 02/21/22  0555 02/21/22  0825   TROPONINI <0.006 0.007       Significant Imaging: Echocardiogram: 2D echo with color flow doppler: No results found for this or any previous visit. and Transthoracic echo (TTE) complete (Cupid Only):   Results for orders placed or performed during the hospital encounter of 02/16/22   Echo   Result Value Ref Range    BSA 2.07 m2    TDI SEPTAL 0.10 m/s    LV LATERAL E/E' RATIO 6.67 m/s    LV SEPTAL E/E' RATIO 8.00 m/s    LA WIDTH 3.99 cm    Right Atrial Pressure (from IVC) 3 mmHg    IVC diameter 1.06 cm    RV mid diameter 2.80 cm    EF 55 %    Left Ventricular Outflow Tract Mean Velocity 0.77621738500109 cm/s    Left Ventricular Outflow Tract Mean Gradient 2.90 mmHg     Pulmonary Valve Mean Velocity 0.69 m/s    TDI LATERAL 0.12 m/s    PV PEAK VELOCITY 1.03 cm/s    LVIDd 4.25 3.5 - 6.0 cm    IVS 0.72 0.6 - 1.1 cm    Posterior Wall 0.71 0.6 - 1.1 cm    LVIDs 2.66 2.1 - 4.0 cm    FS 37 28 - 44 %    LA volume 50.22 cm3    Sinus 3.82 cm    STJ 3.17 cm    Ascending aorta 3.31 cm    LV mass 89.20 g    LA size 2.86 cm    RVDD 3.57 cm    TAPSE 3.04 cm    Right ventricular length in diastole (apical 4-chamber view) 8.28 cm    RV S' 16 cm/s    RA area 20.3 cm2    Left Ventricle Relative Wall Thickness 0.33 cm    AV mean gradient 3 mmHg    AV valve area 3.07 cm2    AV Velocity Ratio 0.96     AV index (prosthetic) 0.98     E/A ratio 1.10     Mean e' 0.11 m/s    E wave deceleration time 218.43676347843185 msec    IVRT 128.628085411785749 msec    Pulm vein S/D ratio 1.09     LVOT diameter 2.00 cm    LVOT area 3.1 cm2    LVOT peak tony 1.31 m/s    LVOT peak VTI 26.40 cm    Ao peak tony 1.36 m/s    Ao VTI 27.0 cm    LVOT stroke volume 82.90 cm3    AV peak gradient 7 mmHg    TV rest pulmonary artery pressure 31 mmHg    E/E' ratio 7.27 m/s    MV Peak E Tony 0.80 m/s    TR Max Tony 2.64 m/s    MV Peak A Tony 0.73 m/s    PV Peak S Tony 0.03169812534928 m/s    PV Peak D Tony 0.44 m/s    LV Systolic Volume 25.99 mL    LV Systolic Volume Index 12.7 mL/m2    LV Diastolic Volume 80.64 mL    LV Diastolic Volume Index 39.53 mL/m2    LA Volume Index 24.6 mL/m2    LV Mass Index 44 g/m2    Right Atrium Volume Systolic 63.24 mL    Echo EF Estimated 68 %    RA Major Axis 5.46 cm    Left Atrium Minor Axis 5.45 cm    Left Atrium Major Axis 4.93 cm    Triscuspid Valve Regurgitation Peak Gradient 28 mmHg    LA Volume Index (Mod) 18.8 mL/m2    LA volume (mod) 38.33 cm3    RA Width 3.38 cm    Narrative    1. The left ventricle (LV) is normal in size with normal systolic   function. Normal LV geometry.  2. There are no significant regional wall motion abnormalities.  3. LV ejection fraction is 55-60%. Normal LV diastolic  function.  4. The right ventricle (RV) is upper limit normal in size with normal   systolic function. RV strain is -22%.  5. Estimated RVSP is normal (less than 40 mmHg). Pulmonary hypertension is   absent.  6. Normal sized atria.  7. Mild mitral and tricuspid regurgitation.  8. When compared to the most recent prior study available, no clinically   significant changes.      and EK ecgs showing AIVR              Assessment and Plan:     AIVR (accelerated idioventricular rhythm)  Ecg consistent with AIVR and av dissociation. Unclear etiology at this time  Symptoms of heaviness likely due to AV dyssynchrony  Agree with ischemic workup  If stress test is negative, we would recommend discontinuation of coreg and starting on verapamil 180 mg XR daily  Will setup with Dr. Coronado in clinic in 2 months      Thank you for your consult. I will sign off. Please contact us if you have any additional questions.    Jen Davila MD  Cardiac Electrophysiology  [facility] - Telemetry Stepdown ([place])

## 2022-02-21 NOTE — PLAN OF CARE
Poc complete. Pt denies palpitations, chest heaviness, nad, vss, Pt to f/u with cardiology in 2 mo. Verbalizes understanding of instructions.

## 2022-02-21 NOTE — ED PROVIDER NOTES
Encounter Date: 2/21/2022       History     Chief Complaint   Patient presents with    Chest Pain     Starting 2 hours ago, described at chest heaviness. States seen at [facility] last week for same complaint with full cardiac work up that was unremarkable, PVCs noted during stay in ED and patient feels he is having runs of PVCs currently. Denies sob, N/V.      Pt is a 65 yo M with PMH of appendectomy, primary HTN, sensorineural hearing loss who presents with chest heaviness and palpitations that started about 2.5 hours ago. Associated sob, no diaphoresis or nausea Pt states the heaviness seems related to palpitations. He has had a recent episode and was evaluated for chest pain He has not had a recent stress or cardiac cath   Did have a recent echo      The history is provided by the patient.     Review of patient's allergies indicates:   Allergen Reactions    Contrast media      Iv dye     Past Medical History:   Diagnosis Date    Primary hypertension 2/16/2022    Sensorineural hearing loss, unspecified     Tear of lateral cartilage or meniscus of knee, current      Past Surgical History:   Procedure Laterality Date    APPENDECTOMY      BCC- nose      COLONOSCOPY N/A 10/2/2020    Procedure: COLONOSCOPY;  Surgeon: Art Seymour MD;  Location: [facility] (4TH FLR);  Service: Endoscopy;  Laterality: N/A;  outside referral received, see -BB  covid 9/29/2077-[id number]  pt requested a Friday appt-BB    CYSTOSCOPY Left 7/30/2018    Procedure: CYSTOSCOPY;  Surgeon: Farida Schneider MD;  Location: [facility] OR [address] [place] [address];  Service: Urology;  Laterality: Left;    KNEE SURGERY  4/2012    left knee- arthroscopic-- torn meniscus    LASER LITHOTRIPSY Left 7/30/2018    Procedure: LITHOTRIPSY, USING LASER;  Surgeon: Farida Schneider MD;  Location: [facility] OR [address] [place] [address];  Service: Urology;  Laterality: Left;    RETROGRADE PYELOGRAPHY Bilateral 7/30/2018    Procedure: PYELOGRAM, RETROGRADE;  Surgeon: Farida KARIMI  MD Wyatt;  Location: [address];  Service: Urology;  Laterality: Bilateral;    URETEROSCOPIC REMOVAL OF URETERIC CALCULUS Bilateral 7/30/2018    Procedure: REMOVAL, CALCULUS, URETER, URETEROSCOPIC;  Surgeon: Farida Schneider MD;  Location: [facility] OR [address] [place] [address];  Service: Urology;  Laterality: Bilateral;    URETEROSCOPY Bilateral 7/30/2018    Procedure: URETEROSCOPY;  Surgeon: Farida Schneider MD;  Location: [address];  Service: Urology;  Laterality: Bilateral;     Family History   Problem Relation Age of Onset    Cancer Mother         breast    Cancer Father         prostate    Cancer Brother         prostate    Hypertension Brother     Atrial fibrillation Brother     Thyroid nodules Brother      Social History     Tobacco Use    Smoking status: Never Smoker    Smokeless tobacco: Never Used   Substance Use Topics    Alcohol use: No     Review of Systems  General: No fever.  No chills.  Eyes: No visual changes.  Head: No headache.    Integument: No rashes or lesions.  Chest: No cough.  Cardiovascular: + chest pain.  Abdomen: No abdominal pain.  No nausea or vomiting.  Urinary: No abnormal urination.  Neurologic: No focal weakness.  No numbness.  Hematologic: No easy bruising.  Endocrine: No excessive thirst or urination.    Physical Exam     Initial Vitals   BP Pulse Resp Temp SpO2   02/21/22 0531 02/21/22 0531 02/21/22 0531 02/21/22 0535 02/21/22 0531   (!) 185/92 63 18 98.5 °F (36.9 °C) 99 %      MAP       --                Physical Exam  Nurses notes reviewed. Vital signs reviewed.  Appearance:  Well-developed.  Well-nourished.  No acute distress.  No diaphoresis  Skin: No rashes seen.  Good turgor.  No abrasions.  No ecchymoses.  Eyes: No conjunctival injection. EOMI.  ENT: normal nose, right external ear normal. Left external ear normal  Chest: No increased respiratory effort.    Cardiovascular: Regular rate and rhythm.  Distal pulses intact  Abdomen: Soft.  Not distended.   Nontender.  No guarding.  No rebound.  Musculoskeletal: Good range of motion all joints.  No deformities.  Neck supple.  No meningismus.  Neurologic: Motor intact.  Sensation intact.  Cerebellar intact.  Cranial nerves intact.  Mental Status:  Alert and oriented x 3.  Appropriate, conversant.  Psych: normal mood, normal affect, appropriate behavior, normal insight and judgment    ED Course   Procedures  Labs Reviewed   CBC W/ AUTO DIFFERENTIAL - Abnormal; Notable for the following components:       Result Value    MCH 31.4 (*)     Platelets 136 (*)     All other components within normal limits    Narrative:     Release to patient->Immediate   SARS-COV-2 RDRP GENE - Normal    Narrative:     This test utilizes isothermal nucleic acid amplification   technology to detect the SARS-CoV-2 RdRp nucleic acid segment.   The analytical sensitivity (limit of detection) is 125 genome   equivalents/mL.   A POSITIVE result implies infection with the SARS-CoV-2 virus;   the patient is presumed to be contagious.     A NEGATIVE result means that SARS-CoV-2 nucleic acids are not   present above the limit of detection. A NEGATIVE result should be   treated as presumptive. It does not rule out the possibility of   COVID-19 and should not be the sole basis for treatment decisions.   If COVID-19 is strongly suspected based on clinical and exposure   history, re-testing using an alternate molecular assay should be   considered.   This test is only for use under the Food and Drug   Administration s Emergency Use Authorization (EUA).   Commercial kits are provided by [employer].   Performance characteristics of the EUA have been independently   verified by [facility] Department of   Pathology and Laboratory Medicine.   _________________________________________________________________   The authorized Fact Sheet for Healthcare Providers and the authorized Fact   Sheet for Patients of the ID NOW COVID-19 are available on  the FDA   website:     https://www.fda.gov/media/379241/download  https://www.fda.gov/media/963132/download          COMPREHENSIVE METABOLIC PANEL    Narrative:     Release to patient->Immediate   B-TYPE NATRIURETIC PEPTIDE    Narrative:     Release to patient->Immediate   TROPONIN I    Narrative:     Release to patient->Immediate   MAGNESIUM    Narrative:     Release to patient->Immediate   LIPASE    Narrative:     Release to patient->Immediate   D DIMER, QUANTITATIVE   HEPATITIS C ANTIBODY     EKG Readings: (Independently Interpreted)   Initial Reading: No STEMI. Previous EKG: Compared with most recent EKG Previous EKG Date: 2/16/22.   05:59 wide qrs rhythm rate of 101 occasional PVCs, left axis deviation     ECG Results          Repeat EKG 12-lead (Final result)  Result time 02/21/22 10:28:47    Final result by Interface, Lab In [facility] (02/21/22 10:28:47)                 Narrative:    Test Reason : R07.89,    Vent. Rate : 062 BPM     Atrial Rate : 062 BPM     P-R Int : 234 ms          QRS Dur : 096 ms      QT Int : 420 ms       P-R-T Axes : 056 023 041 degrees     QTc Int : 426 ms    Sinus rhythm with 1st degree A-V block  Incomplete right bundle branch block  Borderline Abnormal ECG  When compared with ECG of 21-FEB-2022 05:59,  RBBB is no longer present  Vent. rate has decreased BY  39 BPM  Confirmed by BRANDEE RODRIGUEZ MD ([id number]) on 2/21/2022 10:28:40 AM    Referred By: [email or web address]   SELF           Confirmed By:BRANDEE RODRIGUEZ MD                             EKG 12-lead (Final result)  Result time 02/21/22 10:27:25    Final result by Interface, Lab In [facility] (02/21/22 10:27:25)                 Narrative:    Test Reason : (Not Selected)    Vent. Rate : 101 BPM     Atrial Rate : 063 BPM     P-R Int : 000 ms          QRS Dur : 150 ms      QT Int : 414 ms       P-R-T Axes : 000 -52 113 degrees     QTc Int : 536 ms    Probably NSR  Left axis deviation  LVH with QRS widening and repolarization abnormality  Abnormal  ECG  When compared with ECG of 21-FEB-2022 05:36,  No major change  Confirmed by Clem METZGER MD ([id number]) on 2/21/2022 10:27:11 AM    Referred By: SAMANTHA   SELF           Confirmed By:Clem METZGER MD                             EKG 12-lead (Final result)  Result time 02/21/22 10:28:17    Final result by Interface, Lab In [facility] (02/21/22 10:28:17)                 Narrative:    Test Reason : R07.9,    Vent. Rate : 095 BPM     Atrial Rate : 127 BPM     P-R Int : 000 ms          QRS Dur : 146 ms      QT Int : 410 ms       P-R-T Axes : 117 -21 149 degrees     QTc Int : 515 ms    Probably  Sinus tachycardia  RBBB, complete  LVH with QRS widening and repolarization abnormality  Abnormal ECG  When compared with ECG of 16-FEB-2022 09:16,  Vent. rate has increased BY  34 BPM  Confirmed by BRANDEE RODRIGUEZ MD ([id number]) on 2/21/2022 10:28:05 AM    Referred By: SAMANTHA   SELF           Confirmed By:BRANDEE RODRIGUEZ MD                            Imaging Results          X-Ray Chest AP Portable (Final result)  Result time 02/21/22 06:17:13    Final result by Jaime Dior MD (02/21/22 06:17:13)                 Impression:      No acute cardiopulmonary disease.    Electronically signed by resident: Arpita Iqbal  Date:    02/21/2022  Time:    06:03    Electronically signed by: Jaime Dior MD  Date:    02/21/2022  Time:    06:17             Narrative:    EXAMINATION:  XR CHEST AP PORTABLE    CLINICAL HISTORY:  chest heaviness;    TECHNIQUE:  Single frontal view of the chest was performed.    COMPARISON:  Chest radiograph 02/16/2022    FINDINGS:  Cardiac monitoring leads overlying the chest.    Mediastinal structures are midline. Cardiac silhouette and pulmonary vascular distribution are normal.    Lung volumes are normal and symmetric. No pulmonary disease, pleural fluid, pneumothorax, pneumomediastinum, pneumoperitoneum, or significant osseous abnormality.                                 Medications   aspirin chewable  tablet 324 mg (324 mg Oral Given 2/21/22 0555)     Medical Decision Making:   History:   Old Medical Records: I decided to obtain old medical records.  Differential Diagnosis:   ACS, PE, cardiac arrhythmia, pneumonia, pneumothorax, CHF  Clinical Tests:   Lab Tests: Ordered and Reviewed  Radiological Study: Ordered and Reviewed  Medical Tests: Reviewed and Ordered  Other:   I have discussed this case with another health care provider.       <> Summary of the Discussion: Cardiology consulted, patient to be admitted to hospital medicine for a stress test  ekg with narrow complex QRS when HR is low and wide when HR is in the 90s             ED Course as of 02/23/22 2155 Mon Feb 21, 2022   0609 Discussed with cardiology, will get a CTA to make sure no PE, he will come and evaluate the patient [GK]   0610 Has a contrast dye allergy, will order ddimer, may need VQ scan [GK]      ED Course User Index  [GK] Marion Gallardo MD             Clinical Impression:   Final diagnoses:  [R07.89] Chest heaviness  [I49.3] PVC's (premature ventricular contractions) (Primary)          ED Disposition Condition    Observation               Marion Gallardo MD  02/23/22 2155     Electrodesiccation Text: The wound bed was treated with electrodesiccation after the biopsy was performed.

## 2022-02-21 NOTE — SUBJECTIVE & OBJECTIVE
Past Medical History:   Diagnosis Date    Primary hypertension 2/16/2022    Sensorineural hearing loss, unspecified     Tear of lateral cartilage or meniscus of knee, current        Past Surgical History:   Procedure Laterality Date    APPENDECTOMY      BCC- nose      COLONOSCOPY N/A 10/2/2020    Procedure: COLONOSCOPY;  Surgeon: Art Seymour MD;  Location: [facility] (4TH FLR);  Service: Endoscopy;  Laterality: N/A;  outside referral received, see -BB  covid 9/29/2000-cereijk-UD  pt requested a Friday appt-BB    CYSTOSCOPY Left 7/30/2018    Procedure: CYSTOSCOPY;  Surgeon: Farida Schneider MD;  [facility];  Service: Urology;  Laterality: Left;    KNEE SURGERY  4/2012    left knee- arthroscopic-- torn meniscus    LASER LITHOTRIPSY Left 7/30/2018    Procedure: LITHOTRIPSY, USING LASER;  Surgeon: Farida Schneider MD;  Location: [facility] OR [address] [place] [address];  Service: Urology;  Laterality: Left;    RETROGRADE PYELOGRAPHY Bilateral 7/30/2018    Procedure: PYELOGRAM, RETROGRADE;  Surgeon: Farida Schneider MD;  Location: [facility] OR [address] [place] [address];  Service: Urology;  Laterality: Bilateral;    URETEROSCOPIC REMOVAL OF URETERIC CALCULUS Bilateral 7/30/2018    Procedure: REMOVAL, CALCULUS, URETER, URETEROSCOPIC;  Surgeon: Farida Schneider MD;  [facility];  Service: Urology;  Laterality: Bilateral;    URETEROSCOPY Bilateral 7/30/2018    Procedure: URETEROSCOPY;  Surgeon: Farida Schneider MD;  Location: [facility] OR [address] [place] [address];  Service: Urology;  Laterality: Bilateral;       Review of patient's allergies indicates:   Allergen Reactions    Contrast media      Iv dye       Current Facility-Administered Medications on File Prior to Encounter   Medication    gentamicin injection 160 mg     Current Outpatient Medications on File Prior to Encounter   Medication Sig    allopurinoL (ZYLOPRIM) 300 MG tablet Take 1 tablet (300 mg total) by mouth once daily.    aspirin (ECOTRIN) 81 MG EC tablet  Take 1 tablet (81 mg total) by mouth once daily.    carvediloL (COREG) 12.5 MG tablet Take 1 tablet (12.5 mg total) by mouth 2 (two) times daily.    desonide (DESOWEN) 0.05 % cream Apply topically 2 (two) times daily. To be applied on the skin of the face and neck 30 mins after efudex    diltiaZEM (DILACOR XR) 120 MG CDCR Take 1 capsule (120 mg total) by mouth once daily.    fluorouracil (EFUDEX) 5 % cream Apply topically 2 (two) times daily. Apply a thin layer to the face and scalp for 2 weeks, avoid excessive sun exposure during treatment (Patient not taking: Reported on 6/8/2021)    hydroCHLOROthiazide (HYDRODIURIL) 12.5 MG Tab Take 1 tablet (12.5 mg total) by mouth once daily.    lisinopriL 10 MG tablet Take 1 tablet (10 mg total) by mouth 2 (two) times daily. (Patient taking differently: Take 10 mg by mouth once daily.)    potassium citrate (UROCIT-K 10) 10 mEq (1,080 mg) TbSR Take 1 tablet (10 mEq total) by mouth 3 (three) times daily with meals.    rosuvastatin (CRESTOR) 20 MG tablet TAKE 1 TABLET BY MOUTH EVERY DAY IN THE EVENING    sildenafil (VIAGRA) 100 MG tablet Take 100 mg by mouth daily as needed for Erectile Dysfunction.    tadalafil (CIALIS) 20 MG Tab Take 1 tablet (20 mg total) by mouth as needed. 1 tab as needed every 36 hours    tamsulosin (FLOMAX) 0.4 mg Cap     vitamin D (VITAMIN D3) 1000 units Tab Take 1,000 Units by mouth once daily.     Family History       Problem Relation (Age of Onset)    Atrial fibrillation Brother    Cancer Mother, Father, Brother    Hypertension Brother    Thyroid nodules Brother          Tobacco Use    Smoking status: Never Smoker    Smokeless tobacco: Never Used   Substance and Sexual Activity    Alcohol use: No    Drug use: Not on file    Sexual activity: Not on file     Review of Systems   Constitutional: Negative for chills and fever.   Cardiovascular:  Positive for chest pain and palpitations. Negative for dyspnea on exertion and syncope.   Respiratory:  Negative  for cough and sleep disturbances due to breathing.    Musculoskeletal:  Negative for back pain.   Gastrointestinal:  Negative for abdominal pain, nausea and vomiting.   Neurological:  Negative for dizziness and focal weakness.   Psychiatric/Behavioral:  Negative for altered mental status.    Objective:     Vital Signs (Most Recent):  Temp: 98.5 °F (36.9 °C) (02/21/22 0535)  Pulse: 95 (02/21/22 0602)  Resp: 18 (02/21/22 0531)  BP: (!) 177/106 (02/21/22 0602)  SpO2: 100 % (02/21/22 0602)   Vital Signs (24h Range):  Temp:  [98.5 °F (36.9 °C)] 98.5 °F (36.9 °C)  Pulse:  [63-95] 95  Resp:  [18] 18  SpO2:  [99 %-100 %] 100 %  BP: (176-185)/() 177/106     Weight: 88.5 kg (195 lb)  Body mass index is 28.8 kg/m².    SpO2: 100 %  O2 Device (Oxygen Therapy): room air    No intake or output data in the 24 hours ending 02/21/22 0648    Lines/Drains/Airways       Peripheral Intravenous Line  Duration                  Peripheral IV - Single Lumen 02/21/22 0554 20 G Left Antecubital <1 day                    Physical Exam  Constitutional:       General: He is not in acute distress.     Appearance: He is well-developed. He is not diaphoretic.   HENT:      Head: Normocephalic and atraumatic.   Eyes:      Conjunctiva/sclera: Conjunctivae normal.   Neck:      Trachea: No tracheal deviation.   Cardiovascular:      Rate and Rhythm: Normal rate and regular rhythm.      Heart sounds: Normal heart sounds. No murmur heard.  Pulmonary:      Effort: Pulmonary effort is normal. No respiratory distress.      Breath sounds: Normal breath sounds. No wheezing.   Abdominal:      General: Bowel sounds are normal. There is no distension.      Palpations: Abdomen is soft.      Tenderness: There is no abdominal tenderness.   Musculoskeletal:         General: Normal range of motion.      Cervical back: Normal range of motion.   Neurological:      Mental Status: He is alert and oriented to person, place, and time.       Significant Labs: All  pertinent lab results from the last 24 hours have been reviewed.    Significant Imaging: All pertinent images from the last 24h have been reviewed.

## 2022-02-21 NOTE — TELEPHONE ENCOUNTER
Called Mr. Santacruz to get him scheduled with Dr. Coronado in 2m. Left message and call back number.    ----- Message from Jennie Kaiser RN sent at 2/21/2022  3:46 PM CST -----  Dr Coronado saw pt in the hospital and would like to see him in clinic in 2 months.  Dx: accelerated Idioventricular rhythm. Thanks

## 2022-02-22 NOTE — HOSPITAL COURSE
Pt placed in observation for palpitations w/ associated chest pressure. Pt hemodynamically stable. Trop negative x2. ECG consistent with AIVR and av dissociation. Stress test revealed ECG portion of this study is negative for myocardial ischemia. The left ventricle is normal in size with normal systolic function. The estimated ejection fraction is 58%. Normal left ventricular diastolic function. Normal right ventricular size with normal right ventricular systolic function. The stress echo portion of this study is negative for myocardial ischemia. Cardiology following, recommended switch from coreg to verapamil 180 mg qd w/ follow up with Dr. Coronado in 3 months. Changes to meds made and delivered at bedside. Pt stable for discharge. Pt educated on hospital course and plan, verbally agrees and understands. All questions answered.

## 2022-02-22 NOTE — DISCHARGE SUMMARY
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 MED Y Ray Malone PA-C    HPI:    Neeta is a 66 y.o. male w/ pmhx of HTN, who presents w/ c/o of palpitations. Pt states he woke up at 330 am and went to the bathroom. He then started feeling palpitations and chest pressure again, intermittent, not associated with exertion. Of note, On Wednesday he was in clinic and started having palpitations associated with chest pressure. He went to the ED at [facility], cardiac enzymes and echo were normal and was discharged home. He wore a Holter for 48rs to be interpreted by his cardiologist. Pt states other than HTN he has no cardiac history. No prior history of cardiac disease. He is active. Walked/ran 4 miles yesterday and 9 miles one week. Pt states that nothing really triggers the symptoms and he denies any dizziness or associated weakness. Denies fatigue, weakness, SOB, chest pain, n/v/d, abdominal pain. Pt also reports only taking his morning dose of coreg this am and occasionally titrates his BP medications.    In ED, Pt afebrile, hypertensive BP max 185/92 VS otherwise stable. CBC and CMP unremarkable, d dimer wnl, Trop 0.006. ECG sinus tachycardia, 1st degree AV block and IVCD. CXR no acute cardiopulmonary disease. Cardiology consulted in ED, planning for stress echo.  mg x1      * No surgery found *      Hospital Course:   Pt placed in observation for palpitations w/ associated chest pressure. Pt hemodynamically stable. Trop negative x2. ECG consistent with AIVR and av dissociation. Stress test revealed ECG  portion of this study is negative for myocardial ischemia. The left ventricle is normal in size with normal systolic function. The estimated ejection fraction is 58%. Normal left ventricular diastolic function. Normal right ventricular size with normal right ventricular systolic function. The stress echo portion of this study is negative for myocardial ischemia. Cardiology following, recommended switch from coreg to verapamil 180 mg qd w/ follow up with Dr. Coronado in 3 months. Changes to meds made and delivered at bedside. Pt stable for discharge. Pt educated on hospital course and plan, verbally agrees and understands. All questions answered.        Goals of Care Treatment Preferences:  Code Status: Full Code      Consults:   Consults (From admission, onward)        Status Ordering Provider     Inpatient consult to Electrophysiology  Once        Provider:  (Not yet assigned)    Completed KINGS GARCIA     Inpatient consult to Cardiology  Once        Provider:  (Not yet assigned)    Completed YONATHAN THOMAS          * Chest pressure  Palpitations  PVC    -HR 63, /92  -ECG with sinus tachycardia, 1st degree AV block and IVCD.  -Trop <0.006, 0.007 flat  -CXR negative  -Heart Score: 4  -Cardiology consulted in ED, recs appreciated  -last echo reviewed, below  -Stress echo non ischemic    Results for orders placed during the hospital encounter of 02/16/22    Echo    Interpretation Summary  1. The left ventricle (LV) is normal in size with normal systolic function. Normal LV geometry.  2. There are no significant regional wall motion abnormalities.  3. LV ejection fraction is 55-60%. Normal LV diastolic function.  4. The right ventricle (RV) is upper limit normal in size with normal systolic function. RV strain is -22%.  5. Estimated RVSP is normal (less than 40 mmHg). Pulmonary hypertension is absent.  6. Normal sized atria.  7. Mild mitral and tricuspid regurgitation.  8. When compared to the most  recent prior study available, no clinically significant changes.      Final Active Diagnoses:    Diagnosis Date Noted POA    PRINCIPAL PROBLEM:  Chest pressure [R07.89] 02/21/2022 Yes    Palpitations [R00.2] 02/21/2022 Yes    PVC (premature ventricular contraction) [I49.3] 02/21/2022 Yes     Chronic    Hyperlipidemia [E78.5] 02/21/2022 Yes    AIVR (accelerated idioventricular rhythm) [I44.2] 02/21/2022 Yes    Primary hypertension [I10] 02/16/2022 Yes    Hyperuricemia [E79.0] 07/09/2018 Yes      Problems Resolved During this Admission:       Discharged Condition: good    Disposition: Home or Self Care    Follow Up:    Patient Instructions:      Ambulatory referral/consult to Cardiology   Standing Status: Future   Referral Priority: Routine Referral Type: Consultation   Referral Reason: Specialty Services Required   Requested Specialty: Cardiology     Diet Cardiac     Notify your health care provider if you experience any of the following:  difficulty breathing or increased cough     Notify your health care provider if you experience any of the following:  persistent dizziness, light-headedness, or visual disturbances     Notify your health care provider if you experience any of the following:  increased confusion or weakness     Activity as tolerated       Significant Diagnostic Studies: Labs: All labs within the past 24 hours have been reviewed    Pending Diagnostic Studies:     Procedure Component Value Units Date/Time    Hepatitis C Antibody [[id number]] Collected: 02/21/22 0555    Order Status: Sent Lab Status: In process Updated: 02/21/22 0559    Specimen: Blood          Medications:  Reconciled Home Medications:      Medication List      START taking these medications    verapamiL 180 MG CR tablet  Commonly known as: CALAN-SR  Take 1 tablet (180 mg total) by mouth nightly.        CHANGE how you take these medications    lisinopriL 10 MG tablet  Take 1 tablet (10 mg total) by mouth 2 (two) times  daily.  What changed: when to take this        CONTINUE taking these medications    allopurinoL 300 MG tablet  Commonly known as: ZYLOPRIM  Take 1 tablet (300 mg total) by mouth once daily.     aspirin 81 MG EC tablet  Commonly known as: ECOTRIN  Take 1 tablet (81 mg total) by mouth once daily.     desonide 0.05 % cream  Commonly known as: DESOWEN  Apply topically 2 (two) times daily. To be applied on the skin of the face and neck 30 mins after efudex     diltiaZEM 120 MG Cdcr  Commonly known as: DILACOR XR  Take 1 capsule (120 mg total) by mouth once daily.     fluorouraciL 5 % cream  Commonly known as: EFUDEX  Apply topically 2 (two) times daily. Apply a thin layer to the face and scalp for 2 weeks, avoid excessive sun exposure during treatment     hydroCHLOROthiazide 12.5 MG Tab  Commonly known as: HYDRODIURIL  Take 1 tablet (12.5 mg total) by mouth once daily.     potassium citrate 10 mEq (1,080 mg) Tbsr  Commonly known as: UROCIT-K 10  Take 1 tablet (10 mEq total) by mouth 3 (three) times daily with meals.     rosuvastatin 20 MG tablet  Commonly known as: CRESTOR  TAKE 1 TABLET BY MOUTH EVERY DAY IN THE EVENING     sildenafiL 100 MG tablet  Commonly known as: VIAGRA  Take 100 mg by mouth daily as needed for Erectile Dysfunction.     tadalafiL 20 MG Tab  Commonly known as: CIALIS  Take 1 tablet (20 mg total) by mouth as needed. 1 tab as needed every 36 hours     tamsulosin 0.4 mg Cap  Commonly known as: FLOMAX     vitamin D 1000 units Tab  Commonly known as: VITAMIN D3  Take 1,000 Units by mouth once daily.        STOP taking these medications    carvediloL 12.5 MG tablet  Commonly known as: COREG            Indwelling Lines/Drains at time of discharge:   Lines/Drains/Airways     None                 Time spent on the discharge of patient: 36 minutes         Ray Malone PA-C  Department of Hospital Medicine  [facility] - Telemetry Stepdown ([place]-)

## 2022-02-22 NOTE — ASSESSMENT & PLAN NOTE
Palpitations  PVC    -HR 63, /92  -ECG with sinus tachycardia, 1st degree AV block and IVCD.  -Trop <0.006, 0.007 flat  -CXR negative  -Heart Score: 4  -Cardiology consulted in ED, recs appreciated  -last echo reviewed, below  -Stress echo non ischemic    Results for orders placed during the hospital encounter of 02/16/22    Echo    Interpretation Summary  1. The left ventricle (LV) is normal in size with normal systolic function. Normal LV geometry.  2. There are no significant regional wall motion abnormalities.  3. LV ejection fraction is 55-60%. Normal LV diastolic function.  4. The right ventricle (RV) is upper limit normal in size with normal systolic function. RV strain is -22%.  5. Estimated RVSP is normal (less than 40 mmHg). Pulmonary hypertension is absent.  6. Normal sized atria.  7. Mild mitral and tricuspid regurgitation.  8. When compared to the most recent prior study available, no clinically significant changes.

## 2022-02-22 NOTE — PLAN OF CARE
[facility] - Tele[facility] Stepdown ([facility]  Discharge Final Note    Primary Care Provider: Lety Rdz MD     Future Appointments   Date Time Provider Department Center   3/3/2022 10:00 AM Chandu Rice MD [facility]       Pt discharged home with no services.    Logan Dubon RN,BSN          Expected Discharge Date: 2/21/2022    Final Discharge Note (most recent)     Final Note - 02/22/22 0836        Final Note    Assessment Type Discharge Planning Assessment     Anticipated Discharge Disposition Home or Self Care     Hospital Resources/Appts/Education Provided Provided patient/caregiver with written discharge plan information;Appointments scheduled and added to AVS        Post-Acute Status    Discharge Delays None known at this time                 Important Message from Medicare

## 2022-02-24 LAB — HCV AB SERPL QL IA: NEGATIVE

## 2022-04-14 ENCOUNTER — TELEPHONE (OUTPATIENT)
Dept: ELECTROPHYSIOLOGY | Facility: CLINIC | Age: 67
End: 2022-04-14
Payer: MEDICARE

## 2022-04-18 ENCOUNTER — OFFICE VISIT (OUTPATIENT)
Dept: ELECTROPHYSIOLOGY | Facility: CLINIC | Age: 67
End: 2022-04-18
Payer: MEDICARE

## 2022-04-18 ENCOUNTER — HOSPITAL ENCOUNTER (OUTPATIENT)
Dept: CARDIOLOGY | Facility: CLINIC | Age: 67
Discharge: HOME | End: 2022-04-18
Payer: MEDICARE

## 2022-04-18 VITALS
DIASTOLIC BLOOD PRESSURE: 88 MMHG | HEART RATE: 62 BPM | HEIGHT: 69 IN | BODY MASS INDEX: 29.65 KG/M2 | WEIGHT: 200.19 LBS | SYSTOLIC BLOOD PRESSURE: 138 MMHG

## 2022-04-18 DIAGNOSIS — I10 PRIMARY HYPERTENSION: ICD-10-CM

## 2022-04-18 DIAGNOSIS — I49.8 OTHER SPECIFIED CARDIAC ARRHYTHMIAS: ICD-10-CM

## 2022-04-18 DIAGNOSIS — I49.3 PVC (PREMATURE VENTRICULAR CONTRACTION): Primary | Chronic | ICD-10-CM

## 2022-04-18 DIAGNOSIS — I44.2 AIVR (ACCELERATED IDIOVENTRICULAR RHYTHM): ICD-10-CM

## 2022-04-18 DIAGNOSIS — I49.3 PVC'S (PREMATURE VENTRICULAR CONTRACTIONS): ICD-10-CM

## 2022-04-18 DIAGNOSIS — E78.2 MIXED HYPERLIPIDEMIA: ICD-10-CM

## 2022-04-18 PROCEDURE — 4010F PR ACE/ARB THEARPY RXD/TAKEN: ICD-10-PCS | Mod: CPTII,S$GLB,, | Performed by: INTERNAL MEDICINE

## 2022-04-18 PROCEDURE — 1159F MED LIST DOCD IN RCRD: CPT | Mod: CPTII,S$GLB,, | Performed by: INTERNAL MEDICINE

## 2022-04-18 PROCEDURE — 99999 PR PBB SHADOW E&M-EST. PATIENT-LVL III: CPT | Mod: PBBFAC,,, | Performed by: INTERNAL MEDICINE

## 2022-04-18 PROCEDURE — 3008F BODY MASS INDEX DOCD: CPT | Mod: CPTII,S$GLB,, | Performed by: INTERNAL MEDICINE

## 2022-04-18 PROCEDURE — 99214 OFFICE O/P EST MOD 30 MIN: CPT | Mod: S$GLB,,, | Performed by: INTERNAL MEDICINE

## 2022-04-18 PROCEDURE — 99499 RISK ADDL DX/OHS AUDIT: ICD-10-PCS | Mod: S$GLB,,, | Performed by: INTERNAL MEDICINE

## 2022-04-18 PROCEDURE — 99999 PR PBB SHADOW E&M-EST. PATIENT-LVL III: ICD-10-PCS | Mod: PBBFAC,,, | Performed by: INTERNAL MEDICINE

## 2022-04-18 PROCEDURE — 99499 UNLISTED E&M SERVICE: CPT | Mod: S$GLB,,, | Performed by: INTERNAL MEDICINE

## 2022-04-18 PROCEDURE — 1126F AMNT PAIN NOTED NONE PRSNT: CPT | Mod: CPTII,S$GLB,, | Performed by: INTERNAL MEDICINE

## 2022-04-18 PROCEDURE — 3079F PR MOST RECENT DIASTOLIC BLOOD PRESSURE 80-89 MM HG: ICD-10-PCS | Mod: CPTII,S$GLB,, | Performed by: INTERNAL MEDICINE

## 2022-04-18 PROCEDURE — 3079F DIAST BP 80-89 MM HG: CPT | Mod: CPTII,S$GLB,, | Performed by: INTERNAL MEDICINE

## 2022-04-18 PROCEDURE — 4010F ACE/ARB THERAPY RXD/TAKEN: CPT | Mod: CPTII,S$GLB,, | Performed by: INTERNAL MEDICINE

## 2022-04-18 PROCEDURE — 3075F PR MOST RECENT SYSTOLIC BLOOD PRESS GE 130-139MM HG: ICD-10-PCS | Mod: CPTII,S$GLB,, | Performed by: INTERNAL MEDICINE

## 2022-04-18 PROCEDURE — 1159F PR MEDICATION LIST DOCUMENTED IN MEDICAL RECORD: ICD-10-PCS | Mod: CPTII,S$GLB,, | Performed by: INTERNAL MEDICINE

## 2022-04-18 PROCEDURE — 1126F PR PAIN SEVERITY QUANTIFIED, NO PAIN PRESENT: ICD-10-PCS | Mod: CPTII,S$GLB,, | Performed by: INTERNAL MEDICINE

## 2022-04-18 PROCEDURE — 93010 ELECTROCARDIOGRAM REPORT: CPT | Mod: S$GLB,,, | Performed by: INTERNAL MEDICINE

## 2022-04-18 PROCEDURE — 3075F SYST BP GE 130 - 139MM HG: CPT | Mod: CPTII,S$GLB,, | Performed by: INTERNAL MEDICINE

## 2022-04-18 PROCEDURE — 93010 RHYTHM STRIP: ICD-10-PCS | Mod: S$GLB,,, | Performed by: INTERNAL MEDICINE

## 2022-04-18 PROCEDURE — 93005 RHYTHM STRIP: ICD-10-PCS | Mod: S$GLB,,, | Performed by: INTERNAL MEDICINE

## 2022-04-18 PROCEDURE — 3288F FALL RISK ASSESSMENT DOCD: CPT | Mod: CPTII,S$GLB,, | Performed by: INTERNAL MEDICINE

## 2022-04-18 PROCEDURE — 3008F PR BODY MASS INDEX (BMI) DOCUMENTED: ICD-10-PCS | Mod: CPTII,S$GLB,, | Performed by: INTERNAL MEDICINE

## 2022-04-18 PROCEDURE — 1101F PR PT FALLS ASSESS DOC 0-1 FALLS W/OUT INJ PAST YR: ICD-10-PCS | Mod: CPTII,S$GLB,, | Performed by: INTERNAL MEDICINE

## 2022-04-18 PROCEDURE — 93005 ELECTROCARDIOGRAM TRACING: CPT | Mod: S$GLB,,, | Performed by: INTERNAL MEDICINE

## 2022-04-18 PROCEDURE — 1101F PT FALLS ASSESS-DOCD LE1/YR: CPT | Mod: CPTII,S$GLB,, | Performed by: INTERNAL MEDICINE

## 2022-04-18 PROCEDURE — 99214 PR OFFICE/OUTPT VISIT, EST, LEVL IV, 30-39 MIN: ICD-10-PCS | Mod: S$GLB,,, | Performed by: INTERNAL MEDICINE

## 2022-04-18 PROCEDURE — 3288F PR FALLS RISK ASSESSMENT DOCUMENTED: ICD-10-PCS | Mod: CPTII,S$GLB,, | Performed by: INTERNAL MEDICINE

## 2022-04-18 RX ORDER — VERAPAMIL HYDROCHLORIDE 240 MG/1
240 TABLET, FILM COATED, EXTENDED RELEASE ORAL DAILY
Qty: 90 TABLET | Refills: 3 | Status: SHIPPED | OUTPATIENT
Start: 2022-04-18 | End: 2023-05-05 | Stop reason: SDUPTHER

## 2022-04-18 NOTE — PROGRESS NOTES
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 He was started on verapamil 180 mg daily which improved but did not eliminate his symptoms. He recently increased the dose to 270 mg daily which has suppressed his arrhythmias.    An exercise stress echo had Mr. Santacruz exercise to 13 METS (88% MPHR), with no evidence of ischemia. A 48 hour Holter in 2/2022 showed sinus rhythm with an average HR of 60 bpm, PAC/PVC burden <0.1%.     My interpretation of today's ECG is sinus rhythm at 62 bpm. A 30 second rhythm strip showed no PVCs.    Review of Systems   Constitutional: Negative for decreased appetite, malaise/fatigue, weight gain and weight loss.   HENT: Negative for sore throat.    Eyes: Negative for blurred vision.   Cardiovascular: Negative for chest pain, dyspnea on exertion, irregular heartbeat, leg swelling, near-syncope, orthopnea, palpitations, paroxysmal nocturnal dyspnea and syncope.   Respiratory: Negative for shortness of breath.    Skin: Negative for rash.   Musculoskeletal: Negative for arthritis.   Gastrointestinal: Negative for abdominal pain.   Neurological: Negative for focal weakness.   Psychiatric/Behavioral: Negative for altered mental status.   All other systems reviewed and are negative.       Objective:    Physical Exam  Vitals and nursing note reviewed.   Constitutional:       General: He is not in acute distress.     Appearance: He is well-developed.   HENT:      Head: Normocephalic and atraumatic.   Eyes:      General: No scleral icterus.     Pupils: Pupils are equal, round, and reactive to light.   Neck:      Thyroid: No thyromegaly.   Cardiovascular:      Rate and Rhythm: Regular rhythm.      Pulses: Normal pulses.      Heart sounds: Normal heart sounds. No murmur heard.    No  friction rub. No gallop.   Pulmonary:      Effort: Pulmonary effort is normal.      Breath sounds: Normal breath sounds.   Abdominal:      General: Bowel sounds are normal. There is no distension.      Palpations: Abdomen is soft.      Tenderness: There is no abdominal tenderness.   Musculoskeletal:      Cervical back: Neck supple.   Skin:     General: Skin is warm and dry.      Findings: No rash.   Neurological:      Mental Status: He is alert and oriented to person, place, and time.   Psychiatric:         Behavior: Behavior normal.           Assessment:       1. PVC's (premature ventricular contractions)    2. 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 I have ordered verapamil  mg daily. He will see me again PRN.    Thank you for allowing me to participate in the care of this patient. Please do not hesitate to call me with any questions or concerns.

## 2022-08-02 ENCOUNTER — PATIENT MESSAGE (OUTPATIENT)
Dept: OTOLARYNGOLOGY | Facility: CLINIC | Age: 67
End: 2022-08-02
Payer: MEDICARE

## 2022-08-16 ENCOUNTER — OFFICE VISIT (OUTPATIENT)
Dept: OTOLARYNGOLOGY | Facility: CLINIC | Age: 67
End: 2022-08-16
Payer: MEDICARE

## 2022-08-16 ENCOUNTER — CLINICAL SUPPORT (OUTPATIENT)
Dept: OTOLARYNGOLOGY | Facility: CLINIC | Age: 67
End: 2022-08-16
Payer: MEDICARE

## 2022-08-16 VITALS
SYSTOLIC BLOOD PRESSURE: 134 MMHG | HEIGHT: 69 IN | BODY MASS INDEX: 29.52 KG/M2 | DIASTOLIC BLOOD PRESSURE: 88 MMHG | WEIGHT: 199.31 LBS | HEART RATE: 74 BPM

## 2022-08-16 DIAGNOSIS — H90.A31 MIXED CONDUCTIVE AND SENSORINEURAL HEARING LOSS OF RIGHT EAR WITH RESTRICTED HEARING OF LEFT EAR: Primary | ICD-10-CM

## 2022-08-16 DIAGNOSIS — H90.A22 SENSORINEURAL HEARING LOSS (SNHL) OF LEFT EAR WITH RESTRICTED HEARING OF RIGHT EAR: Primary | Chronic | ICD-10-CM

## 2022-08-16 DIAGNOSIS — H90.A31 MIXED CONDUCTIVE AND SENSORINEURAL HEARING LOSS OF RIGHT EAR WITH RESTRICTED HEARING OF LEFT EAR: ICD-10-CM

## 2022-08-16 DIAGNOSIS — H93.13 TINNITUS AURIUM, BILATERAL: Chronic | ICD-10-CM

## 2022-08-16 DIAGNOSIS — H69.93 ETD (EUSTACHIAN TUBE DYSFUNCTION), BILATERAL: ICD-10-CM

## 2022-08-16 PROCEDURE — 3079F PR MOST RECENT DIASTOLIC BLOOD PRESSURE 80-89 MM HG: ICD-10-PCS | Mod: CPTII,S$GLB,, | Performed by: OTOLARYNGOLOGY

## 2022-08-16 PROCEDURE — 1160F PR REVIEW ALL MEDS BY PRESCRIBER/CLIN PHARMACIST DOCUMENTED: ICD-10-PCS | Mod: CPTII,S$GLB,, | Performed by: OTOLARYNGOLOGY

## 2022-08-16 PROCEDURE — 3079F DIAST BP 80-89 MM HG: CPT | Mod: CPTII,S$GLB,, | Performed by: OTOLARYNGOLOGY

## 2022-08-16 PROCEDURE — 1126F PR PAIN SEVERITY QUANTIFIED, NO PAIN PRESENT: ICD-10-PCS | Mod: CPTII,S$GLB,, | Performed by: OTOLARYNGOLOGY

## 2022-08-16 PROCEDURE — 3288F FALL RISK ASSESSMENT DOCD: CPT | Mod: CPTII,S$GLB,, | Performed by: OTOLARYNGOLOGY

## 2022-08-16 PROCEDURE — 92557 COMPREHENSIVE HEARING TEST: CPT | Mod: S$GLB,,, | Performed by: PHYSICIAN ASSISTANT

## 2022-08-16 PROCEDURE — 3075F SYST BP GE 130 - 139MM HG: CPT | Mod: CPTII,S$GLB,, | Performed by: OTOLARYNGOLOGY

## 2022-08-16 PROCEDURE — 99204 PR OFFICE/OUTPT VISIT, NEW, LEVL IV, 45-59 MIN: ICD-10-PCS | Mod: S$GLB,,, | Performed by: OTOLARYNGOLOGY

## 2022-08-16 PROCEDURE — 4010F PR ACE/ARB THEARPY RXD/TAKEN: ICD-10-PCS | Mod: CPTII,S$GLB,, | Performed by: OTOLARYNGOLOGY

## 2022-08-16 PROCEDURE — 99999 PR PBB SHADOW E&M-EST. PATIENT-LVL III: ICD-10-PCS | Mod: PBBFAC,,, | Performed by: OTOLARYNGOLOGY

## 2022-08-16 PROCEDURE — 1126F AMNT PAIN NOTED NONE PRSNT: CPT | Mod: CPTII,S$GLB,, | Performed by: OTOLARYNGOLOGY

## 2022-08-16 PROCEDURE — 92557 PR COMPREHENSIVE HEARING TEST: ICD-10-PCS | Mod: S$GLB,,, | Performed by: PHYSICIAN ASSISTANT

## 2022-08-16 PROCEDURE — 99204 OFFICE O/P NEW MOD 45 MIN: CPT | Mod: S$GLB,,, | Performed by: OTOLARYNGOLOGY

## 2022-08-16 PROCEDURE — 99999 PR PBB SHADOW E&M-EST. PATIENT-LVL III: CPT | Mod: PBBFAC,,, | Performed by: OTOLARYNGOLOGY

## 2022-08-16 PROCEDURE — 3008F PR BODY MASS INDEX (BMI) DOCUMENTED: ICD-10-PCS | Mod: CPTII,S$GLB,, | Performed by: OTOLARYNGOLOGY

## 2022-08-16 PROCEDURE — 3075F PR MOST RECENT SYSTOLIC BLOOD PRESS GE 130-139MM HG: ICD-10-PCS | Mod: CPTII,S$GLB,, | Performed by: OTOLARYNGOLOGY

## 2022-08-16 PROCEDURE — 1159F PR MEDICATION LIST DOCUMENTED IN MEDICAL RECORD: ICD-10-PCS | Mod: CPTII,S$GLB,, | Performed by: OTOLARYNGOLOGY

## 2022-08-16 PROCEDURE — 1101F PR PT FALLS ASSESS DOC 0-1 FALLS W/OUT INJ PAST YR: ICD-10-PCS | Mod: CPTII,S$GLB,, | Performed by: OTOLARYNGOLOGY

## 2022-08-16 PROCEDURE — 92567 PR TYMPA2METRY: ICD-10-PCS | Mod: S$GLB,,, | Performed by: PHYSICIAN ASSISTANT

## 2022-08-16 PROCEDURE — 1160F RVW MEDS BY RX/DR IN RCRD: CPT | Mod: CPTII,S$GLB,, | Performed by: OTOLARYNGOLOGY

## 2022-08-16 PROCEDURE — 92567 TYMPANOMETRY: CPT | Mod: S$GLB,,, | Performed by: PHYSICIAN ASSISTANT

## 2022-08-16 PROCEDURE — 1101F PT FALLS ASSESS-DOCD LE1/YR: CPT | Mod: CPTII,S$GLB,, | Performed by: OTOLARYNGOLOGY

## 2022-08-16 PROCEDURE — 3008F BODY MASS INDEX DOCD: CPT | Mod: CPTII,S$GLB,, | Performed by: OTOLARYNGOLOGY

## 2022-08-16 PROCEDURE — 1159F MED LIST DOCD IN RCRD: CPT | Mod: CPTII,S$GLB,, | Performed by: OTOLARYNGOLOGY

## 2022-08-16 PROCEDURE — 3288F PR FALLS RISK ASSESSMENT DOCUMENTED: ICD-10-PCS | Mod: CPTII,S$GLB,, | Performed by: OTOLARYNGOLOGY

## 2022-08-16 PROCEDURE — 4010F ACE/ARB THERAPY RXD/TAKEN: CPT | Mod: CPTII,S$GLB,, | Performed by: OTOLARYNGOLOGY

## 2022-08-16 RX ORDER — FLUTICASONE PROPIONATE 50 MCG
2 SPRAY, SUSPENSION (ML) NASAL DAILY
Qty: 9.9 ML | Refills: 11 | Status: SHIPPED | OUTPATIENT
Start: 2022-08-16 | End: 2023-03-06 | Stop reason: SDUPTHER

## 2022-08-16 RX ORDER — LEVOCETIRIZINE DIHYDROCHLORIDE 5 MG/1
5 TABLET, FILM COATED ORAL NIGHTLY
Qty: 30 TABLET | Refills: 11 | Status: SHIPPED | OUTPATIENT
Start: 2022-08-16 | End: 2023-08-16

## 2022-08-16 NOTE — PROGRESS NOTES
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  He reported his hearing in his right ear seems worse following an upper respiratory infection a few months ago with his right ear feeling muffled and stopped up at times.  He does not report much difficulty with understanding conversation except in the presence of background noise. He does not feel he is ready for hearing aids at this time.    Audiogram results revealed a mild/moderate mixed hearing loss in the right ear and mild sensorineural hearing loss in the left ear.  Speech reception thresholds were noted at 25 dB in the right ear and 20 dB in the left ear.  Speech discrimination scores were 96% in the right ear and 96% in the left ear.  Tympanometry revealed Type A in the right ear and Type A in the left ear.     Recommendations:  1. Otologic evaluation  2. Annual audiogram  3. Hearing protection when in noise

## 2022-08-16 NOTE — PATIENT INSTRUCTIONS
"Hydrops Diet    The primary goal of treatment is to provide stable body fluid/blood levels so that secondary fluctuations in the inner ear fluid can be avoided. The secondary goal of treatment is to avoid migraine trigger foods, as migraine is commonly associated with Meniere's disease.     Aim for a 1.5 gram sodium intake diet. High sodium intake results in fluctuations in the inner ear fluid pressure and may increase your symptoms. Aim for a diet high in fresh fruits, vegetables and whole grains, and low in canned, frozen or processed foods.   One teaspoon of table salt has about 2 grams of sodium. Note that sodium (one of the two elements in table salt) is not exactly the same as sodium chloride (salt). There are many other foods and chemicals that we ingest that contain sodium.     Drink adequate amounts of fluid daily. This should include water, milk and low-sugar fruit juices (for example, cranberry or cranapple). Try to anticipate fluid loss which will occur with exercise or heat, and replace these fluids before they are lost. Some studies suggest that drinking more water helps, perhaps because it dilutes out the salt. Don't overdo it though - -one can get "water intoxication".     Avoid caffeine-containing fluids and foods (such as coffee, tea and chocolate). Caffeine has stimulant properties that may make Meniere's symptoms worse. Caffeine also may make tinnitus louder. Two regular cups of coffee/day, or the equivalent in other beverages, is ususually safe however.     Limit your alcohol intake to one glass of beer or wine each day. Alcohol, especially red wine is a migraine trigger. Migraine and Meniere's are linked.     Avoid foods containing MSG (monosodium glutamate). This is often present in pre-packaged food products (such as flavored chips) and in Chinese food.     Distribute your food and fluid intake evenly throughout the day and from day to day. Eat approximately the same amount of food at each " meal and do not skip meals. If you eat snacks, have them at regular times.

## 2022-08-16 NOTE — PROGRESS NOTES
Chief Complaint   Patient presents with    Hearing Loss     Hearing loss in right ear since having URI a few months ago, bilateral tinnitus over years   .     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 He reports hearing loss that has been gradually progressing since 2011.  He has had both hearing loss and tinnitus.  He had URI in spring and felt that he had worsening hearing in the right ear.  He notes that his right ear seems muffled and stopped up intermittently.    He has noted any difference in hearing between the ears, with the right ear being the worse hearing ear. He feels that he will have intermittent feeling of pressure which seems to improve with coughing or sneezing. He denies a family history of hearing loss, and has not had any previous otologic surgery.  He denies any history of significant loud noise exposure. He had been on HCTZ several years ago by Dr. Schaefer but did not tolerate well. He had audiogram at that time showing SNHL from his recollection.         Past Medical History:   Diagnosis Date    Primary hypertension 2/16/2022    Sensorineural hearing loss, unspecified     Tear of lateral cartilage or meniscus of knee, current      Social History     Socioeconomic History    Marital status:    Tobacco Use    Smoking status: Never Smoker    Smokeless tobacco: Never Used   Substance and Sexual Activity    Alcohol use: No   Social History Narrative         Spouse in good health     3 children , 1 son, 2 dtrs     Past Surgical History:   Procedure Laterality Date    APPENDECTOMY      BCC- nose      COLONOSCOPY N/A 10/2/2020    Procedure: COLONOSCOPY;  Surgeon: Art Seymour MD;  [facility] [place] [address]);  Service: Endoscopy;  Laterality: N/A;  outside referral received, see -BB  covid 9/29/[id number]  pt requested a Friday appt-PENELOPE    CYSTOSCOPY Left 7/30/2018     Procedure: CYSTOSCOPY;  Surgeon: Farida Schneider MD;  Location: [facility] OR [address] [place] [address];  Service: Urology;  Laterality: Left;    KNEE SURGERY  4/2012    left knee- arthroscopic-- torn meniscus    LASER LITHOTRIPSY Left 7/30/2018    Procedure: LITHOTRIPSY, USING LASER;  Surgeon: Farida Schneider MD;  Location: [facility] OR [address] [place] [address];  Service: Urology;  Laterality: Left;    RETROGRADE PYELOGRAPHY Bilateral 7/30/2018    Procedure: PYELOGRAM, RETROGRADE;  Surgeon: Farida Schneider MD;  Location: [facility] OR [address] [place] [address];  Service: Urology;  Laterality: Bilateral;    URETEROSCOPIC REMOVAL OF URETERIC CALCULUS Bilateral 7/30/2018    Procedure: REMOVAL, CALCULUS, URETER, URETEROSCOPIC;  Surgeon: Farida Schneider MD;  Location: [facility] OR [address] [place] [address];  Service: Urology;  Laterality: Bilateral;    URETEROSCOPY Bilateral 7/30/2018    Procedure: URETEROSCOPY;  Surgeon: Farida Schneider MD;  Location: [facility] OR [address] [place] [address];  Service: Urology;  Laterality: Bilateral;     Family History   Problem Relation Age of Onset    Cancer Mother         breast    Cancer Father         prostate    Cancer Brother         prostate    Hypertension Brother     Atrial fibrillation Brother     Thyroid nodules Brother          Review of Systems  General: negative for chills, fever or weight loss  Psychological: negative for mood changes or depression  Ophthalmic: negative for blurry vision, photophobia or eye pain  ENT: see HPI  Respiratory: no cough, shortness of breath, or wheezing  Cardiovascular: no chest pain or dyspnea on exertion  Gastrointestinal: no abdominal pain, change in bowel habits, or black/ bloody stools  Musculoskeletal: negative for gait disturbance or muscular weakness  Neurological: no syncope or seizures; no ataxia  Dermatological: negative for puritis,  rash and jaundice  Hematologic/lymphatic: no easy bruising, no new lumps or bumps      Physical Exam:    Vitals:    08/16/22 0944   BP: 134/88   Pulse: 74        Constitutional: Well appearing / communicating without difficutly.  NAD.  Eyes: EOM I Bilaterally  Head/Face: Normocephalic.  Negative paranasal sinus pressure/tenderness.  Salivary glands WNL.  House Brackmann I Bilaterally.    Right Ear: Auricle normal appearance. External Auditory Canal within normal limits no lesions or masses,TM w/o masses/lesions/perforations. TM mobility noted.   Left Ear: Auricle normal appearance. External Auditory Canal within normal limits no lesions or masses,TM w/o masses/lesions/perforations. TM mobility noted.  Rinne Air conduction >bone conduction bilaterally, Ragland midline.   Nose: No gross nasal septal deviation. Inferior Turbinates 3+ bilaterally. No septal perforation. No masses/lesions. External nasal skin appears normal without masses/lesions.  Oral Cavity: Gingiva/lips within normal limits.  Dentition/gingiva healthy appearing. Mucus membranes moist. Floor of mouth soft, no masses palpated. Oral Tongue mobile. Hard Palate appears normal.    Oropharynx: Base of tongue appears normal. No masses/lesions noted. Tonsillar fossa/pharyngeal wall without lesions. Posterior oropharynx WNL.  Soft palate without masses. Midline uvula.   Neck/Lymphatic: No LAD I-VI bilaterally.  No thyromegaly.  No masses noted on exam.      Diagnostic studies:  Audiogram interpreted personally by me and discussed in detail with the patient today. Audiogram results revealed a mild/moderate mixed hearing loss in the right ear and mild sensorineural hearing loss in the left ear.  Speech reception thresholds were noted at 25 dB in the right ear and 20 dB in the left ear.  Speech discrimination scores were 96% in the right ear and 96% in the left ear.  Tympanometry revealed Type A in the right ear and Type A in the left ear.           Assessment:    ICD-10-CM ICD-9-CM    1. Sensorineural hearing loss (SNHL) of left ear with restricted hearing of right ear  H90.A22 389.22    2. Tinnitus aurium, bilateral   H93.13 388.31    3. ETD (Eustachian tube dysfunction), bilateral  H69.83 381.81    4. Mixed conductive and sensorineural hearing loss of right ear with restricted hearing of left ear  H90.A31 389.22      The primary encounter diagnosis was Sensorineural hearing loss (SNHL) of left ear with restricted hearing of right ear. 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      Plan:  No orders of the defined types were placed in this encounter.    Start nasal saline rinses BID  Start Flonase 2 sprays per nostril daily  Start xyzal 5 mg PO daily   Autoinsufflation exercises  Consider hydrops component.   We reviewed the patient's recent audiogram and hearing loss in detail.  We also discussed that he is a good candidate for hearing aids, if and when he the patient is motivated.    We also discussed the use hearing protection when exposed to loud noise, including lawn equipment.  Follow up in 3 months with audiogram    Thank you kindly for allowing me to participate in the patient's care.     Richa Sanz MD

## 2023-02-07 DIAGNOSIS — Z00.00 ENCOUNTER FOR MEDICARE ANNUAL WELLNESS EXAM: ICD-10-CM

## 2023-02-09 DIAGNOSIS — Z00.00 ENCOUNTER FOR MEDICARE ANNUAL WELLNESS EXAM: ICD-10-CM

## 2023-08-16 ENCOUNTER — PES CALL (OUTPATIENT)
Dept: ADMINISTRATIVE | Facility: CLINIC | Age: 68
End: 2023-08-16
Payer: MEDICARE

## 2023-09-14 ENCOUNTER — PATIENT OUTREACH (OUTPATIENT)
Dept: ADMINISTRATIVE | Facility: HOSPITAL | Age: 68
End: 2023-09-14
Payer: MEDICARE

## 2023-09-14 ENCOUNTER — PATIENT MESSAGE (OUTPATIENT)
Dept: ADMINISTRATIVE | Facility: HOSPITAL | Age: 68
End: 2023-09-14
Payer: MEDICARE

## 2023-09-19 ENCOUNTER — LAB VISIT (OUTPATIENT)
Dept: LAB | Facility: HOSPITAL | Age: 68
End: 2023-09-19
Payer: MEDICARE

## 2023-09-19 DIAGNOSIS — R00.2 PALPITATIONS: ICD-10-CM

## 2023-09-19 DIAGNOSIS — Z80.42 FAMILY HISTORY OF MALIGNANT NEOPLASM OF PROSTATE: ICD-10-CM

## 2023-09-19 DIAGNOSIS — I10 PRIMARY HYPERTENSION: ICD-10-CM

## 2023-09-19 DIAGNOSIS — N13.30 HYDRONEPHROSIS, UNSPECIFIED HYDRONEPHROSIS TYPE: ICD-10-CM

## 2023-09-19 DIAGNOSIS — E78.5 HYPERLIPIDEMIA, UNSPECIFIED HYPERLIPIDEMIA TYPE: ICD-10-CM

## 2023-09-19 DIAGNOSIS — Z12.5 ENCOUNTER FOR SCREENING FOR MALIGNANT NEOPLASM OF PROSTATE: ICD-10-CM

## 2023-09-19 LAB
ALBUMIN SERPL BCP-MCNC: 4.3 G/DL (ref 3.5–5.2)
ALP SERPL-CCNC: 56 U/L (ref 55–135)
ALT SERPL W/O P-5'-P-CCNC: 30 U/L (ref 10–44)
ANION GAP SERPL CALC-SCNC: 7 MMOL/L (ref 8–16)
AST SERPL-CCNC: 27 U/L (ref 10–40)
BASOPHILS # BLD AUTO: 0.03 K/UL (ref 0–0.2)
BASOPHILS NFR BLD: 0.6 % (ref 0–1.9)
BILIRUB SERPL-MCNC: 0.7 MG/DL (ref 0.1–1)
BUN SERPL-MCNC: 21 MG/DL (ref 8–23)
CALCIUM SERPL-MCNC: 10.1 MG/DL (ref 8.7–10.5)
CHLORIDE SERPL-SCNC: 110 MMOL/L (ref 95–110)
CHOLEST SERPL-MCNC: 126 MG/DL (ref 120–199)
CHOLEST/HDLC SERPL: 2.7 {RATIO} (ref 2–5)
CO2 SERPL-SCNC: 26 MMOL/L (ref 23–29)
COMPLEXED PSA SERPL-MCNC: 0.55 NG/ML (ref 0–4)
CREAT SERPL-MCNC: 1.4 MG/DL (ref 0.5–1.4)
DIFFERENTIAL METHOD: ABNORMAL
EOSINOPHIL # BLD AUTO: 0.1 K/UL (ref 0–0.5)
EOSINOPHIL NFR BLD: 0.9 % (ref 0–8)
ERYTHROCYTE [DISTWIDTH] IN BLOOD BY AUTOMATED COUNT: 12.3 % (ref 11.5–14.5)
EST. GFR  (NO RACE VARIABLE): 54.4 ML/MIN/1.73 M^2
GLUCOSE SERPL-MCNC: 84 MG/DL (ref 70–110)
HCT VFR BLD AUTO: 42.9 % (ref 40–54)
HDLC SERPL-MCNC: 47 MG/DL (ref 40–75)
HDLC SERPL: 37.3 % (ref 20–50)
HGB BLD-MCNC: 15.2 G/DL (ref 14–18)
IMM GRANULOCYTES # BLD AUTO: 0.02 K/UL (ref 0–0.04)
IMM GRANULOCYTES NFR BLD AUTO: 0.4 % (ref 0–0.5)
LDLC SERPL CALC-MCNC: 54 MG/DL (ref 63–159)
LYMPHOCYTES # BLD AUTO: 1.1 K/UL (ref 1–4.8)
LYMPHOCYTES NFR BLD: 20.4 % (ref 18–48)
MCH RBC QN AUTO: 32.8 PG (ref 27–31)
MCHC RBC AUTO-ENTMCNC: 35.4 G/DL (ref 32–36)
MCV RBC AUTO: 93 FL (ref 82–98)
MONOCYTES # BLD AUTO: 0.5 K/UL (ref 0.3–1)
MONOCYTES NFR BLD: 8.8 % (ref 4–15)
NEUTROPHILS # BLD AUTO: 3.7 K/UL (ref 1.8–7.7)
NEUTROPHILS NFR BLD: 68.9 % (ref 38–73)
NONHDLC SERPL-MCNC: 79 MG/DL
NRBC BLD-RTO: 0 /100 WBC
PLATELET # BLD AUTO: 143 K/UL (ref 150–450)
PMV BLD AUTO: 10.1 FL (ref 9.2–12.9)
POTASSIUM SERPL-SCNC: 5.3 MMOL/L (ref 3.5–5.1)
PROT SERPL-MCNC: 7 G/DL (ref 6–8.4)
RBC # BLD AUTO: 4.64 M/UL (ref 4.6–6.2)
SODIUM SERPL-SCNC: 143 MMOL/L (ref 136–145)
TRIGL SERPL-MCNC: 125 MG/DL (ref 30–150)
TSH SERPL DL<=0.005 MIU/L-ACNC: 1.35 UIU/ML (ref 0.4–4)
WBC # BLD AUTO: 5.43 K/UL (ref 3.9–12.7)

## 2023-09-19 PROCEDURE — 36415 COLL VENOUS BLD VENIPUNCTURE: CPT | Mod: HCNC,PO | Performed by: NURSE PRACTITIONER

## 2023-09-19 PROCEDURE — 85025 COMPLETE CBC W/AUTO DIFF WBC: CPT | Mod: HCNC | Performed by: NURSE PRACTITIONER

## 2023-09-19 PROCEDURE — 84153 ASSAY OF PSA TOTAL: CPT | Mod: HCNC | Performed by: NURSE PRACTITIONER

## 2023-09-19 PROCEDURE — 84443 ASSAY THYROID STIM HORMONE: CPT | Mod: HCNC | Performed by: NURSE PRACTITIONER

## 2023-09-19 PROCEDURE — 80053 COMPREHEN METABOLIC PANEL: CPT | Mod: HCNC | Performed by: NURSE PRACTITIONER

## 2023-09-19 PROCEDURE — 80061 LIPID PANEL: CPT | Mod: HCNC | Performed by: NURSE PRACTITIONER

## 2024-04-09 ENCOUNTER — PATIENT MESSAGE (OUTPATIENT)
Dept: OTOLARYNGOLOGY | Facility: CLINIC | Age: 69
End: 2024-04-09
Payer: MEDICARE

## 2025-01-30 DIAGNOSIS — Z00.00 ENCOUNTER FOR MEDICARE ANNUAL WELLNESS EXAM: ICD-10-CM

## (undated) DEVICE — SHEATH URET FLEXOR 12FR 45CM

## (undated) DEVICE — EXTRACTOR TIPLESS 2.4FRX1115CM

## (undated) DEVICE — Device

## (undated) DEVICE — FIBER MOSES 365 DFL

## (undated) DEVICE — GUIDEWIRE AMPLATZ .035X145CM

## (undated) DEVICE — GUIDEWIRE STR TIP HIWIRE 150CM

## (undated) DEVICE — CATH POLLACK OPEN-END FLEXI-TI